# Patient Record
Sex: FEMALE | Race: WHITE | Employment: OTHER | URBAN - NONMETROPOLITAN AREA
[De-identification: names, ages, dates, MRNs, and addresses within clinical notes are randomized per-mention and may not be internally consistent; named-entity substitution may affect disease eponyms.]

---

## 2017-03-20 ENCOUNTER — TELEPHONE (OUTPATIENT)
Dept: FAMILY MEDICINE CLINIC | Age: 57
End: 2017-03-20

## 2017-06-08 DIAGNOSIS — E78.2 MIXED HYPERLIPIDEMIA: Primary | ICD-10-CM

## 2017-06-08 RX ORDER — PRAVASTATIN SODIUM 40 MG
40 TABLET ORAL EVERY EVENING
Qty: 30 TABLET | Refills: 0 | Status: SHIPPED | OUTPATIENT
Start: 2017-06-08 | End: 2017-07-05 | Stop reason: SDUPTHER

## 2017-06-19 DIAGNOSIS — Z12.31 VISIT FOR SCREENING MAMMOGRAM: Primary | ICD-10-CM

## 2017-07-05 ENCOUNTER — OFFICE VISIT (OUTPATIENT)
Dept: FAMILY MEDICINE CLINIC | Age: 57
End: 2017-07-05
Payer: COMMERCIAL

## 2017-07-05 VITALS
HEIGHT: 64 IN | HEART RATE: 84 BPM | BODY MASS INDEX: 50.02 KG/M2 | RESPIRATION RATE: 16 BRPM | WEIGHT: 293 LBS | SYSTOLIC BLOOD PRESSURE: 130 MMHG | DIASTOLIC BLOOD PRESSURE: 88 MMHG

## 2017-07-05 DIAGNOSIS — E78.2 MIXED HYPERLIPIDEMIA: Primary | ICD-10-CM

## 2017-07-05 DIAGNOSIS — Z79.899 HIGH RISK MEDICATION USE: ICD-10-CM

## 2017-07-05 DIAGNOSIS — Z23 NEED FOR PROPHYLACTIC VACCINATION AGAINST DIPHTHERIA-TETANUS-PERTUSSIS (DTP): ICD-10-CM

## 2017-07-05 DIAGNOSIS — R73.01 IFG (IMPAIRED FASTING GLUCOSE): ICD-10-CM

## 2017-07-05 DIAGNOSIS — E03.9 ACQUIRED HYPOTHYROIDISM: ICD-10-CM

## 2017-07-05 PROCEDURE — 99214 OFFICE O/P EST MOD 30 MIN: CPT | Performed by: FAMILY MEDICINE

## 2017-07-05 RX ORDER — PRAVASTATIN SODIUM 40 MG
40 TABLET ORAL EVERY EVENING
Qty: 30 TABLET | Refills: 6 | Status: SHIPPED | OUTPATIENT
Start: 2017-07-05 | End: 2018-01-09 | Stop reason: SDUPTHER

## 2017-07-05 RX ORDER — ASPIRIN 81 MG/1
81 TABLET, CHEWABLE ORAL DAILY
COMMUNITY

## 2017-07-05 ASSESSMENT — PATIENT HEALTH QUESTIONNAIRE - PHQ9
SUM OF ALL RESPONSES TO PHQ9 QUESTIONS 1 & 2: 0
SUM OF ALL RESPONSES TO PHQ QUESTIONS 1-9: 0
2. FEELING DOWN, DEPRESSED OR HOPELESS: 0
1. LITTLE INTEREST OR PLEASURE IN DOING THINGS: 0

## 2017-09-16 ENCOUNTER — HOSPITAL ENCOUNTER (OUTPATIENT)
Dept: LAB | Age: 57
Setting detail: SPECIMEN
Discharge: HOME | End: 2017-09-16
Payer: COMMERCIAL

## 2017-09-16 DIAGNOSIS — E78.2 MIXED HYPERLIPIDEMIA: ICD-10-CM

## 2017-09-16 DIAGNOSIS — Z79.899 HIGH RISK MEDICATION USE: ICD-10-CM

## 2017-09-16 DIAGNOSIS — E03.9 ACQUIRED HYPOTHYROIDISM: ICD-10-CM

## 2017-09-16 LAB
AST SERPL-CCNC: 15 U/L
CHOLESTEROL/HDL RATIO: 2.8
CHOLESTEROL: 165 MG/DL
HDLC SERPL-MCNC: 58 MG/DL
LDL CHOLESTEROL: 86 MG/DL (ref 0–130)
TRIGL SERPL-MCNC: 103 MG/DL
VLDLC SERPL CALC-MCNC: NORMAL MG/DL (ref 1–30)

## 2017-09-16 PROCEDURE — 80061 LIPID PANEL: CPT

## 2017-09-16 PROCEDURE — 84450 TRANSFERASE (AST) (SGOT): CPT

## 2017-09-16 PROCEDURE — 36415 COLL VENOUS BLD VENIPUNCTURE: CPT

## 2017-09-19 DIAGNOSIS — E03.9 ACQUIRED HYPOTHYROIDISM: Primary | ICD-10-CM

## 2018-01-06 ENCOUNTER — HOSPITAL ENCOUNTER (OUTPATIENT)
Dept: LAB | Age: 58
Setting detail: SPECIMEN
Discharge: HOME | End: 2018-01-06
Payer: COMMERCIAL

## 2018-01-06 DIAGNOSIS — E03.9 ACQUIRED HYPOTHYROIDISM: ICD-10-CM

## 2018-01-06 DIAGNOSIS — R73.01 IFG (IMPAIRED FASTING GLUCOSE): ICD-10-CM

## 2018-01-06 DIAGNOSIS — E78.2 MIXED HYPERLIPIDEMIA: ICD-10-CM

## 2018-01-06 LAB
ALBUMIN SERPL-MCNC: 4.1 G/DL (ref 3.5–5.2)
ALBUMIN/GLOBULIN RATIO: 1.4 (ref 1–2.5)
ALP BLD-CCNC: 101 U/L (ref 35–104)
ALT SERPL-CCNC: 15 U/L (ref 5–33)
ANION GAP SERPL CALCULATED.3IONS-SCNC: 13 MMOL/L (ref 9–17)
AST SERPL-CCNC: 16 U/L
BILIRUB SERPL-MCNC: 0.74 MG/DL (ref 0.3–1.2)
BUN BLDV-MCNC: 14 MG/DL (ref 6–20)
BUN/CREAT BLD: 26 (ref 9–20)
CALCIUM SERPL-MCNC: 9.1 MG/DL (ref 8.6–10.4)
CHLORIDE BLD-SCNC: 102 MMOL/L (ref 98–107)
CHOLESTEROL/HDL RATIO: 3.3
CHOLESTEROL: 209 MG/DL
CO2: 29 MMOL/L (ref 20–31)
CREAT SERPL-MCNC: 0.53 MG/DL (ref 0.5–0.9)
ESTIMATED AVERAGE GLUCOSE: 114 MG/DL
GFR AFRICAN AMERICAN: >60 ML/MIN
GFR NON-AFRICAN AMERICAN: >60 ML/MIN
GFR SERPL CREATININE-BSD FRML MDRD: ABNORMAL ML/MIN/{1.73_M2}
GFR SERPL CREATININE-BSD FRML MDRD: ABNORMAL ML/MIN/{1.73_M2}
GLUCOSE BLD-MCNC: 110 MG/DL (ref 70–99)
HBA1C MFR BLD: 5.6 % (ref 4.8–5.9)
HDLC SERPL-MCNC: 63 MG/DL
LDL CHOLESTEROL: 126 MG/DL (ref 0–130)
POTASSIUM SERPL-SCNC: 4.3 MMOL/L (ref 3.7–5.3)
SODIUM BLD-SCNC: 144 MMOL/L (ref 135–144)
THYROXINE, FREE: 1.17 NG/DL (ref 0.93–1.7)
TOTAL PROTEIN: 7.1 G/DL (ref 6.4–8.3)
TRIGL SERPL-MCNC: 98 MG/DL
TSH SERPL DL<=0.05 MIU/L-ACNC: 3.55 MIU/L (ref 0.3–5)
VLDLC SERPL CALC-MCNC: ABNORMAL MG/DL (ref 1–30)

## 2018-01-06 PROCEDURE — 84439 ASSAY OF FREE THYROXINE: CPT

## 2018-01-06 PROCEDURE — 36415 COLL VENOUS BLD VENIPUNCTURE: CPT

## 2018-01-06 PROCEDURE — 80053 COMPREHEN METABOLIC PANEL: CPT

## 2018-01-06 PROCEDURE — 83036 HEMOGLOBIN GLYCOSYLATED A1C: CPT

## 2018-01-06 PROCEDURE — 84443 ASSAY THYROID STIM HORMONE: CPT

## 2018-01-06 PROCEDURE — 80061 LIPID PANEL: CPT

## 2018-01-09 ENCOUNTER — OFFICE VISIT (OUTPATIENT)
Dept: FAMILY MEDICINE CLINIC | Age: 58
End: 2018-01-09
Payer: COMMERCIAL

## 2018-01-09 VITALS
SYSTOLIC BLOOD PRESSURE: 120 MMHG | DIASTOLIC BLOOD PRESSURE: 68 MMHG | HEART RATE: 86 BPM | WEIGHT: 293 LBS | BODY MASS INDEX: 50.02 KG/M2 | RESPIRATION RATE: 16 BRPM | HEIGHT: 64 IN

## 2018-01-09 DIAGNOSIS — Z12.4 CERVICAL CANCER SCREENING: ICD-10-CM

## 2018-01-09 DIAGNOSIS — E78.2 MIXED HYPERLIPIDEMIA: ICD-10-CM

## 2018-01-09 DIAGNOSIS — Z23 NEED FOR SHINGLES VACCINE: ICD-10-CM

## 2018-01-09 DIAGNOSIS — E03.9 ACQUIRED HYPOTHYROIDISM: Primary | ICD-10-CM

## 2018-01-09 DIAGNOSIS — R73.01 IFG (IMPAIRED FASTING GLUCOSE): ICD-10-CM

## 2018-01-09 PROCEDURE — 99214 OFFICE O/P EST MOD 30 MIN: CPT | Performed by: FAMILY MEDICINE

## 2018-01-09 RX ORDER — LEVOTHYROXINE SODIUM 88 UG/1
88 TABLET ORAL DAILY
Qty: 30 TABLET | Refills: 11 | Status: SHIPPED | OUTPATIENT
Start: 2018-01-09 | End: 2019-01-16 | Stop reason: SDUPTHER

## 2018-01-09 RX ORDER — PRAVASTATIN SODIUM 40 MG
40 TABLET ORAL EVERY EVENING
Qty: 30 TABLET | Refills: 6 | Status: SHIPPED | OUTPATIENT
Start: 2018-01-09 | End: 2018-07-11 | Stop reason: SINTOL

## 2018-01-09 NOTE — PROGRESS NOTES
[address], [address] [place] [address] Drive                        Telephone [phone]             Fax [phone]     Deana Nyhan  1960  MRN:  [id number]  Date of visit:  1/9/18    Subjective:    Deana Nyhan is a 56 y.o.  female who presents to [facility] today (1/9/18) for follow up/evaluation of:  Hyperlipidemia (6 month follow up) and Hyperglycemia (6 month follow up)      She states that she has been feeling well. She is tolerating her medications well. She denies chest pain or shortness of breath with activity or at rest.  She has no new concerns or complaints at this time. She has the following problem list:  Patient Active Problem List   Diagnosis    Acquired hypothyroidism    Vitamin D deficiency    Extreme obesity ([address]    Mixed hyperlipidemia    IFG (impaired fasting glucose)    High risk medication use        Current medications are:  Outpatient Prescriptions Marked as Taking for the 1/9/18 encounter (Office Visit) with Gisele Siemens, MD   Medication Sig Dispense Refill    aspirin 81 MG chewable tablet Take 81 mg by mouth daily      pravastatin (PRAVACHOL) 40 MG tablet Take 1 tablet by mouth every evening 30 tablet 6    levothyroxine (SYNTHROID) 88 MCG tablet Take 1 tablet by mouth daily 30 tablet 11    vitamin D (CHOLECALCIFEROL) 1000 UNIT TABS tablet Take 1,000 Units by mouth daily.  Calcium Carbonate-Vitamin D (CALCIUM PLUS VITAMIN D PO) Take  by mouth daily.  Multiple Vitamins-Minerals (MULTIVITAMIN PO) Take  by mouth daily. She is allergic to atorvastatin and penicillins. She  reports that she has never smoked.  She has never used smokeless tobacco.      Objective:    Vitals:    01/09/18 1547   BP: 120/68   Site: Right Arm   Position: Sitting   Cuff Size: Large Adult   Pulse: 86   Resp: 16   Weight: (!) 320 lb 6.4 oz (145.3 kg) by mouth daily  Dispense: 30 tablet; Refill: 11    2. Mixed hyperlipidemia  Her lipid profile was reasonable on her recent lab work. She is tolerating Pravastatin well; this was refilled:   - pravastatin (PRAVACHOL) 40 MG tablet; Take 1 tablet by mouth every evening  Dispense: 30 tablet; Refill: 6    Labs were ordered to be done prior to her return visit in 6 months:  - Comprehensive Metabolic Panel; Future  - Lipid Panel; Future    3. IFG (impaired fasting glucose)  Her fasting glucose is elevated, but her HgbA1c is within normal limits. I recommended that she decrease her intake of processed carbohydrates, begin a regular exercise program, and attempt to lose weight.  - Hemoglobin A1C; Future prior to her return visit in 6 months. 4. Routine health maintenance  Health maintenance was reviewed with the patient. She has received an influenza vaccine this influenza season. Colonoscopy was recommended. She declined to schedule an appointment for a colonoscopy consultation, but she is considering Cologuard. Tdap was recommended and declined. She was advised that she is due for an annual exam and Pap test.  Shingrix was recommended, and a printed prescription for Shingrix was given to the patient. There is no indication for Pneumovax or Prevnar-13 at this time.      (Please note that portions of this note were completed with a voice-recognition program. Efforts were made to edit the dictation but occasionally words are mis-transcribed.)

## 2018-04-03 ENCOUNTER — TELEPHONE (OUTPATIENT)
Dept: FAMILY MEDICINE CLINIC | Age: 58
End: 2018-04-03

## 2018-07-05 ENCOUNTER — HOSPITAL ENCOUNTER (OUTPATIENT)
Dept: LAB | Age: 58
Setting detail: SPECIMEN
Discharge: HOME | End: 2018-07-05
Payer: COMMERCIAL

## 2018-07-05 DIAGNOSIS — R73.01 IFG (IMPAIRED FASTING GLUCOSE): ICD-10-CM

## 2018-07-05 DIAGNOSIS — E78.2 MIXED HYPERLIPIDEMIA: ICD-10-CM

## 2018-07-05 LAB
ALBUMIN SERPL-MCNC: 3.7 G/DL (ref 3.5–5.2)
ALBUMIN/GLOBULIN RATIO: 1.1 (ref 1–2.5)
ALP BLD-CCNC: 110 U/L (ref 35–104)
ALT SERPL-CCNC: 14 U/L (ref 5–33)
ANION GAP SERPL CALCULATED.3IONS-SCNC: 10 MMOL/L (ref 9–17)
AST SERPL-CCNC: 13 U/L
BILIRUB SERPL-MCNC: 0.54 MG/DL (ref 0.3–1.2)
BUN BLDV-MCNC: 14 MG/DL (ref 6–20)
BUN/CREAT BLD: 20 (ref 9–20)
CALCIUM SERPL-MCNC: 9.1 MG/DL (ref 8.6–10.4)
CHLORIDE BLD-SCNC: 102 MMOL/L (ref 98–107)
CHOLESTEROL/HDL RATIO: 5.1
CHOLESTEROL: 240 MG/DL
CO2: 30 MMOL/L (ref 20–31)
CREAT SERPL-MCNC: 0.71 MG/DL (ref 0.5–0.9)
ESTIMATED AVERAGE GLUCOSE: 120 MG/DL
GFR AFRICAN AMERICAN: >60 ML/MIN
GFR NON-AFRICAN AMERICAN: >60 ML/MIN
GFR SERPL CREATININE-BSD FRML MDRD: ABNORMAL ML/MIN/{1.73_M2}
GFR SERPL CREATININE-BSD FRML MDRD: ABNORMAL ML/MIN/{1.73_M2}
GLUCOSE BLD-MCNC: 107 MG/DL (ref 70–99)
HBA1C MFR BLD: 5.8 % (ref 4.8–5.9)
HDLC SERPL-MCNC: 47 MG/DL
LDL CHOLESTEROL: 161 MG/DL (ref 0–130)
POTASSIUM SERPL-SCNC: 4.3 MMOL/L (ref 3.7–5.3)
SODIUM BLD-SCNC: 142 MMOL/L (ref 135–144)
TOTAL PROTEIN: 7 G/DL (ref 6.4–8.3)
TRIGL SERPL-MCNC: 158 MG/DL
VLDLC SERPL CALC-MCNC: ABNORMAL MG/DL (ref 1–30)

## 2018-07-05 PROCEDURE — 80061 LIPID PANEL: CPT

## 2018-07-05 PROCEDURE — 36415 COLL VENOUS BLD VENIPUNCTURE: CPT

## 2018-07-05 PROCEDURE — 83036 HEMOGLOBIN GLYCOSYLATED A1C: CPT

## 2018-07-05 PROCEDURE — 80053 COMPREHEN METABOLIC PANEL: CPT

## 2018-07-11 ENCOUNTER — HOSPITAL ENCOUNTER (OUTPATIENT)
Age: 58
Setting detail: SPECIMEN
Discharge: HOME | End: 2018-07-11
Payer: COMMERCIAL

## 2018-07-11 ENCOUNTER — OFFICE VISIT (OUTPATIENT)
Dept: FAMILY MEDICINE CLINIC | Age: 58
End: 2018-07-11
Payer: COMMERCIAL

## 2018-07-11 VITALS
BODY MASS INDEX: 50.02 KG/M2 | HEIGHT: 64 IN | DIASTOLIC BLOOD PRESSURE: 86 MMHG | SYSTOLIC BLOOD PRESSURE: 138 MMHG | HEART RATE: 84 BPM | WEIGHT: 293 LBS

## 2018-07-11 DIAGNOSIS — R03.0 ELEVATED BLOOD PRESSURE READING: ICD-10-CM

## 2018-07-11 DIAGNOSIS — R73.01 IFG (IMPAIRED FASTING GLUCOSE): ICD-10-CM

## 2018-07-11 DIAGNOSIS — Z01.419 WELL FEMALE EXAM WITH ROUTINE GYNECOLOGICAL EXAM: ICD-10-CM

## 2018-07-11 DIAGNOSIS — Z01.419 WELL FEMALE EXAM WITH ROUTINE GYNECOLOGICAL EXAM: Primary | ICD-10-CM

## 2018-07-11 DIAGNOSIS — Z12.31 ENCOUNTER FOR SCREENING MAMMOGRAM FOR BREAST CANCER: ICD-10-CM

## 2018-07-11 DIAGNOSIS — Z23 NEED FOR SHINGLES VACCINE: ICD-10-CM

## 2018-07-11 DIAGNOSIS — E78.2 MIXED HYPERLIPIDEMIA: ICD-10-CM

## 2018-07-11 DIAGNOSIS — E03.9 ACQUIRED HYPOTHYROIDISM: ICD-10-CM

## 2018-07-11 PROCEDURE — 87624 HPV HI-RISK TYP POOLED RSLT: CPT

## 2018-07-11 PROCEDURE — G0145 SCR C/V CYTO,THINLAYER,RESCR: HCPCS

## 2018-07-11 PROCEDURE — 99396 PREV VISIT EST AGE 40-64: CPT | Performed by: FAMILY MEDICINE

## 2018-07-11 PROCEDURE — 99213 OFFICE O/P EST LOW 20 MIN: CPT | Performed by: FAMILY MEDICINE

## 2018-07-11 RX ORDER — EZETIMIBE 10 MG/1
10 TABLET ORAL DAILY
Qty: 30 TABLET | Refills: 3 | Status: SHIPPED | OUTPATIENT
Start: 2018-07-11 | End: 2018-12-20 | Stop reason: SDUPTHER

## 2018-07-11 RX ORDER — BLOOD PRESSURE TEST KIT
KIT MISCELLANEOUS
Qty: 1 KIT | Refills: 0 | Status: SHIPPED | OUTPATIENT
Start: 2018-07-11

## 2018-07-11 ASSESSMENT — PATIENT HEALTH QUESTIONNAIRE - PHQ9
1. LITTLE INTEREST OR PLEASURE IN DOING THINGS: 0
SUM OF ALL RESPONSES TO PHQ9 QUESTIONS 1 & 2: 0
2. FEELING DOWN, DEPRESSED OR HOPELESS: 0
SUM OF ALL RESPONSES TO PHQ QUESTIONS 1-9: 0

## 2018-07-11 NOTE — PROGRESS NOTES
- 2.5 Final    GFR Non- 07/05/2018 >60  >60 mL/min Final    GFR  07/05/2018 >60  >60 mL/min Final    GFR Comment 07/05/2018        Final    Comment: Average GFR for 52-63 years old:   80 mL/min/1.73sq m  Chronic Kidney Disease:   <60 mL/min/1.73sq m  Kidney failure:   <15 mL/min/1.73sq m              eGFR calculated using average adult body mass. Additional eGFR calculator   available at:        [email or web address]            GFR Staging 07/05/2018 NOT REPORTED   Final    Hemoglobin A1C 07/05/2018 5.8  4.8 - 5.9 % Final    Estimated Avg Glucose 07/05/2018 120  mg/dL Final    Cholesterol 07/05/2018 240* <200 mg/dL Final    Comment:    Cholesterol Guidelines:      <200  Desirable   200-240  Borderline      >240  Undesirable         HDL 07/05/2018 47  >40 mg/dL Final    Comment:    HDL Guidelines:    <40     Undesirable   40-59    Borderline    >59     Desirable         LDL Cholesterol 07/05/2018 161* 0 - 130 mg/dL Final    Comment:    LDL Guidelines:     <100    Desirable   100-129   Near to/above Desirable   130-159   Borderline      >159   Undesirable     Direct (measured) LDL and calculated LDL are not interchangeable tests.  Chol/HDL Ratio 07/05/2018 5.1* <5 Final            Triglycerides 07/05/2018 158* <150 mg/dL Final    Comment:    Triglyceride Guidelines:     <150   Desirable   150-199  Borderline   200-499  High     >499   Very high   Based on AHA Guidelines for fasting triglyceride, October 2012.  VLDL 07/05/2018 NOT REPORTED  1 - 30 mg/dL Final       Assessment and Plan:    1. Well female exam with routine gynecological exam  Pap smear was obtained today as she had a normal Pap test less than three years ago. Mammogram was ordered. She was advised to continue annual mammograms and physical exams, with a Pap test every 3 years. 2. Mixed hyperlipidemia  Her lipid profile was worse on her recent lab work.     She has been intolerant of statins in the past.  Zetia was prescribed:  - ezetimibe (ZETIA) 10 MG tablet; Take 1 tablet by mouth daily  Dispense: 30 tablet; Refill: 3    - Lipid Panel; Future prior to her return visit in 6 months. 3. IFG (impaired fasting glucose)  Her fasting glucose is elevated, but her HgbA1c is within normal limits. I recommended that she decrease her intake of processed carbohydrates, begin a regular exercise program, and attempt to lose weight.  - Hemoglobin A1C; Future prior to her return visit in 6 months. 4. Acquired hypothyroidism  TSH and free T4 were within normal limits on 1/6/2018. She was advised to continue with her current dose of Levothyroxine. Labs were ordered to be done prior to her return visit in 6 months:  - T4, Free; Future  - TSH without Reflex; Future    5. Elevated blood pressure reading  She was advised to check her blood pressure at home. She was given a prescription for a blood pressure monitor:  - Blood Pressure KIT; Check blood pressure daily prn. Dispense: 1 kit; Refill: 0  - Comprehensive Metabolic Panel; Future prior to her return visit in 6 months. 6.  Routine health maintenance  Health maintenance was reviewed with the patient. Screening mammogram was recommended and ordered. Shingrix was recommended, and a printed prescription for Shingrix was given to the patient. Colonoscopy was recommended. She declined. She plans to check with her insurance company to see if Cologuard is covered. I inadvertently did not discuss Tdap with her today. There is no indication for Pneumovax or Prevnar-13 at this time.          (Please note that portions of this note were completed with a voice-recognition program. Efforts were made to edit the dictation but occasionally words are mis-transcribed.)

## 2018-07-13 ENCOUNTER — HOSPITAL ENCOUNTER (OUTPATIENT)
Dept: MAMMOGRAPHY | Age: 58
Discharge: HOME | End: 2018-07-15
Payer: COMMERCIAL

## 2018-07-13 DIAGNOSIS — Z12.31 ENCOUNTER FOR SCREENING MAMMOGRAM FOR BREAST CANCER: ICD-10-CM

## 2018-07-13 PROCEDURE — 77063 BREAST TOMOSYNTHESIS BI: CPT

## 2018-07-26 DIAGNOSIS — R87.616 TRANSFORMATION ZONE ABSENT ON CERVICAL PAP SMEAR: Primary | ICD-10-CM

## 2018-07-27 LAB
HPV SAMPLE: NORMAL
HPV SOURCE: NORMAL
HPV, GENOTYPE 16: NOT DETECTED
HPV, GENOTYPE 18: NOT DETECTED
HPV, HIGH RISK OTHER: NOT DETECTED
HPV, INTERPRETATION: NORMAL

## 2018-07-28 LAB — CYTOLOGY REPORT: NORMAL

## 2018-12-20 DIAGNOSIS — E78.2 MIXED HYPERLIPIDEMIA: ICD-10-CM

## 2018-12-21 RX ORDER — EZETIMIBE 10 MG/1
10 TABLET ORAL DAILY
Qty: 30 TABLET | Refills: 3 | Status: SHIPPED | OUTPATIENT
Start: 2018-12-21 | End: 2019-01-16 | Stop reason: SDUPTHER

## 2019-01-12 ENCOUNTER — HOSPITAL ENCOUNTER (OUTPATIENT)
Dept: LAB | Age: 59
Discharge: HOME | End: 2019-01-12
Payer: COMMERCIAL

## 2019-01-12 DIAGNOSIS — R03.0 ELEVATED BLOOD PRESSURE READING: ICD-10-CM

## 2019-01-12 DIAGNOSIS — R73.01 IFG (IMPAIRED FASTING GLUCOSE): ICD-10-CM

## 2019-01-12 DIAGNOSIS — E78.2 MIXED HYPERLIPIDEMIA: ICD-10-CM

## 2019-01-12 DIAGNOSIS — E03.9 ACQUIRED HYPOTHYROIDISM: ICD-10-CM

## 2019-01-12 LAB
ALBUMIN SERPL-MCNC: 4.1 G/DL (ref 3.5–5.2)
ALBUMIN/GLOBULIN RATIO: 1.2 (ref 1–2.5)
ALP BLD-CCNC: 99 U/L (ref 35–104)
ALT SERPL-CCNC: 16 U/L (ref 5–33)
ANION GAP SERPL CALCULATED.3IONS-SCNC: 12 MMOL/L (ref 9–17)
AST SERPL-CCNC: 15 U/L
BILIRUB SERPL-MCNC: 0.67 MG/DL (ref 0.3–1.2)
BUN BLDV-MCNC: 11 MG/DL (ref 6–20)
BUN/CREAT BLD: 17 (ref 9–20)
CALCIUM SERPL-MCNC: 9 MG/DL (ref 8.6–10.4)
CHLORIDE BLD-SCNC: 101 MMOL/L (ref 98–107)
CHOLESTEROL/HDL RATIO: 3.5
CHOLESTEROL: 189 MG/DL
CO2: 29 MMOL/L (ref 20–31)
CREAT SERPL-MCNC: 0.63 MG/DL (ref 0.5–0.9)
ESTIMATED AVERAGE GLUCOSE: 114 MG/DL
GFR AFRICAN AMERICAN: >60 ML/MIN
GFR NON-AFRICAN AMERICAN: >60 ML/MIN
GFR SERPL CREATININE-BSD FRML MDRD: ABNORMAL ML/MIN/{1.73_M2}
GFR SERPL CREATININE-BSD FRML MDRD: ABNORMAL ML/MIN/{1.73_M2}
GLUCOSE BLD-MCNC: 109 MG/DL (ref 70–99)
HBA1C MFR BLD: 5.6 % (ref 4.8–5.9)
HDLC SERPL-MCNC: 54 MG/DL
LDL CHOLESTEROL: 113 MG/DL (ref 0–130)
POTASSIUM SERPL-SCNC: 4.2 MMOL/L (ref 3.7–5.3)
SODIUM BLD-SCNC: 142 MMOL/L (ref 135–144)
THYROXINE, FREE: 1.14 NG/DL (ref 0.93–1.7)
TOTAL PROTEIN: 7.4 G/DL (ref 6.4–8.3)
TRIGL SERPL-MCNC: 112 MG/DL
TSH SERPL DL<=0.05 MIU/L-ACNC: 3.18 MIU/L (ref 0.3–5)
VLDLC SERPL CALC-MCNC: NORMAL MG/DL (ref 1–30)

## 2019-01-12 PROCEDURE — 84439 ASSAY OF FREE THYROXINE: CPT

## 2019-01-12 PROCEDURE — 80061 LIPID PANEL: CPT

## 2019-01-12 PROCEDURE — 84443 ASSAY THYROID STIM HORMONE: CPT

## 2019-01-12 PROCEDURE — 80053 COMPREHEN METABOLIC PANEL: CPT

## 2019-01-12 PROCEDURE — 83036 HEMOGLOBIN GLYCOSYLATED A1C: CPT

## 2019-01-12 PROCEDURE — 36415 COLL VENOUS BLD VENIPUNCTURE: CPT

## 2019-01-16 ENCOUNTER — OFFICE VISIT (OUTPATIENT)
Dept: FAMILY MEDICINE CLINIC | Age: 59
End: 2019-01-16
Payer: COMMERCIAL

## 2019-01-16 VITALS
HEIGHT: 64 IN | HEART RATE: 86 BPM | WEIGHT: 293 LBS | RESPIRATION RATE: 16 BRPM | DIASTOLIC BLOOD PRESSURE: 92 MMHG | SYSTOLIC BLOOD PRESSURE: 138 MMHG | BODY MASS INDEX: 50.02 KG/M2

## 2019-01-16 DIAGNOSIS — R03.0 ELEVATED BLOOD PRESSURE READING: ICD-10-CM

## 2019-01-16 DIAGNOSIS — R60.0 BILATERAL LOWER EXTREMITY EDEMA: ICD-10-CM

## 2019-01-16 DIAGNOSIS — E78.2 MIXED HYPERLIPIDEMIA: ICD-10-CM

## 2019-01-16 DIAGNOSIS — E03.9 ACQUIRED HYPOTHYROIDISM: Primary | ICD-10-CM

## 2019-01-16 DIAGNOSIS — R73.01 IFG (IMPAIRED FASTING GLUCOSE): ICD-10-CM

## 2019-01-16 PROCEDURE — 99214 OFFICE O/P EST MOD 30 MIN: CPT | Performed by: FAMILY MEDICINE

## 2019-01-16 RX ORDER — HYDROCHLOROTHIAZIDE 25 MG/1
25 TABLET ORAL EVERY MORNING
Qty: 90 TABLET | Refills: 1 | Status: SHIPPED | OUTPATIENT
Start: 2019-01-16 | End: 2019-05-09 | Stop reason: SDUPTHER

## 2019-01-16 RX ORDER — EZETIMIBE 10 MG/1
10 TABLET ORAL DAILY
Qty: 30 TABLET | Refills: 6 | Status: SHIPPED | OUTPATIENT
Start: 2019-01-16 | End: 2019-07-18 | Stop reason: SDUPTHER

## 2019-01-16 RX ORDER — LEVOTHYROXINE SODIUM 88 UG/1
88 TABLET ORAL DAILY
Qty: 30 TABLET | Refills: 11 | Status: SHIPPED | OUTPATIENT
Start: 2019-01-16 | End: 2020-01-20 | Stop reason: SDUPTHER

## 2019-01-16 ASSESSMENT — PATIENT HEALTH QUESTIONNAIRE - PHQ9
SUM OF ALL RESPONSES TO PHQ QUESTIONS 1-9: 0
2. FEELING DOWN, DEPRESSED OR HOPELESS: 0
SUM OF ALL RESPONSES TO PHQ9 QUESTIONS 1 & 2: 0
SUM OF ALL RESPONSES TO PHQ QUESTIONS 1-9: 0
1. LITTLE INTEREST OR PLEASURE IN DOING THINGS: 0

## 2019-04-05 ENCOUNTER — HOSPITAL ENCOUNTER (EMERGENCY)
Age: 59
Discharge: HOME | End: 2019-04-05
Attending: EMERGENCY MEDICINE
Payer: COMMERCIAL

## 2019-04-05 VITALS
HEIGHT: 64 IN | RESPIRATION RATE: 16 BRPM | SYSTOLIC BLOOD PRESSURE: 203 MMHG | OXYGEN SATURATION: 99 % | DIASTOLIC BLOOD PRESSURE: 93 MMHG | WEIGHT: 293 LBS | TEMPERATURE: 98.6 F | BODY MASS INDEX: 50.02 KG/M2 | HEART RATE: 94 BPM

## 2019-04-05 DIAGNOSIS — I10 CHRONIC HYPERTENSION: ICD-10-CM

## 2019-04-05 DIAGNOSIS — R04.0 EPISTAXIS: Primary | ICD-10-CM

## 2019-04-05 PROCEDURE — 6370000000 HC RX 637 (ALT 250 FOR IP): Performed by: EMERGENCY MEDICINE

## 2019-04-05 PROCEDURE — 2500000003 HC RX 250 WO HCPCS: Performed by: EMERGENCY MEDICINE

## 2019-04-05 PROCEDURE — 99283 EMERGENCY DEPT VISIT LOW MDM: CPT

## 2019-04-05 RX ORDER — CLONIDINE HYDROCHLORIDE 0.1 MG/1
0.1 TABLET ORAL ONCE
Status: COMPLETED | OUTPATIENT
Start: 2019-04-05 | End: 2019-04-05

## 2019-04-05 RX ORDER — TRANEXAMIC ACID 100 MG/ML
1000 INJECTION, SOLUTION INTRAVENOUS ONCE
Status: COMPLETED | OUTPATIENT
Start: 2019-04-05 | End: 2019-04-05

## 2019-04-05 RX ADMIN — TRANEXAMIC ACID 1000 MG: 1 INJECTION, SOLUTION INTRAVENOUS at 08:25

## 2019-04-05 RX ADMIN — CLONIDINE HYDROCHLORIDE 0.1 MG: 0.1 TABLET ORAL at 08:23

## 2019-04-05 ASSESSMENT — ENCOUNTER SYMPTOMS
VOMITING: 0
NAUSEA: 0
COUGH: 0

## 2019-04-05 NOTE — ED PROVIDER NOTES
[facility]  [facility]  Phone: [phone]        [address] [place] [address] ED  eMERGENCY dEPARTMENT eNCOUnter      Pt Name: Vikash Bentley  MRN: [id number]  Dariagfdillan 1960  Date of evaluation: 4/5/2019  Provider: Valencia Hernandes DO    CHIEF COMPLAINT       Chief Complaint   Patient presents with    Epistaxis         HISTORY OF PRESENT ILLNESS   (Location/Symptom, Timing/Onset,Context/Setting, Quality, Duration, Modifying Factors, Severity)  Note limiting factors. Vikash Bentley is a 66 y.o. female who presents to the emergency department for evaluation of nosebleed. She states that this did start last night, she slept sitting up which helped, today, was persistent. Patient has had some intermittent nosebleeds in the past.  She denies injury. She has history of hypertension and did not take any of her medications yet today because of the nosebleed. She denies headache, change in vision, chest pain and shortness of breath. Nursing Notes were reviewed. REVIEW OF SYSTEMS    (2-9systems for level 4, 10 or more for level 5)     Review of Systems   Constitutional: Negative for fever. HENT: Positive for nosebleeds. Respiratory: Negative for cough. Cardiovascular: Negative for chest pain. Gastrointestinal: Negative for nausea and vomiting. Genitourinary: Negative for hematuria. Musculoskeletal: Negative for joint swelling. Skin: Negative for rash. Neurological: Negative for weakness. All other systems reviewed and are negative. Except asnoted above the remainder of the review of systems was reviewed and negative.        PAST MEDICAL HISTORY     Past Medical History:   Diagnosis Date    Dyslipidemia     Elevated blood pressure     Extreme obesity     Hypothyroidism     IFG (impaired fasting glucose) 8/11/2016    Lower extremity edema     Vitamin D deficiency          SURGICAL HISTORY       Past Surgical History:   Procedure on phone: None     Gets together: None     Attends [employer] service: None     Active member of club or organization: None     Attends meetings of clubs or organizations: None     Relationship status: None    Intimate partner violence:     Fear of current or ex partner: None     Emotionally abused: None     Physically abused: None     Forced sexual activity: None   Other Topics Concern    None   Social History Narrative    None       SCREENINGS             PHYSICAL EXAM    (up to 7 for level 4, 8 or more for level 5)     ED Triage Vitals [04/05/19 0749]   BP Temp Temp Source Pulse Resp SpO2 Height Weight   (!) 201/117 98.6 °F (37 °C) Tympanic 98 18 98 % 5' 4\" (1.626 m) (!) 350 lb (158.8 kg)       Physical Exam   Constitutional: She appears well-developed and well-nourished. No distress. HENT:   Head: Normocephalic and atraumatic. Mouth/Throat: Oropharynx is clear and moist.   Epistaxis is noted, appears to be coming from the anterior left nare, along the nasal septum. Eyes: Conjunctivae are normal.   Pupils are equally round and reacting normally. Extraoccular muscles are grossly intact. Neck: Normal range of motion. No tracheal deviation present. Cardiovascular: Normal rate, regular rhythm, normal heart sounds and intact distal pulses. Exam reveals no friction rub. No murmur heard. Pulmonary/Chest: Effort normal and breath sounds normal. No stridor. No respiratory distress. She has no wheezes. She has no rales. Abdominal: Soft. She exhibits no distension. Musculoskeletal: Normal range of motion. She exhibits no edema or deformity. Neurological: She is alert. Answering questions appropriately. No gaze deficit. No gait abnormality. Moving all extremities. Skin: Skin is warm and dry. Psychiatric: She has a normal mood and affect.  Judgment normal.       EMERGENCY DEPARTMENT COURSE and DIFFERENTIAL DIAGNOSIS/MDM:   Vitals:    Vitals:    04/05/19 0749 04/05/19 0919   BP: (!) 201/117 (!) 203/93   Pulse: 98 94   Resp: 18 16   Temp: 98.6 °F (37 °C)    TempSrc: Tympanic    SpO2: 98% 99%   Weight: (!) 350 lb (158.8 kg)    Height: 5' 4\" (1.626 m)        Patient presents to the emergency department with the complaint described above. Vital signs show hypertension which will subsequently be treated even though it is grossly asymptomatic. Certainly bleeding from the nose could be related. I do not think any blood work is indicated, no imaging is indicated. I did have the patient blow her nose out very well, I used TXA on some gauze and we did pack it. Initial attempts at stopping the epistaxis failed. I did end up having to use a [employer]. I put 4 mL of air in it. She was comfortable, bleeding ceased. She has instructions to follow up Monday or Tuesday with primary care physician to have it removed. I have discussed the high blood pressure with the patient. I have explained good control of this is important for future health. I have recommended a discussion with their PCP at the next follow-up appointment. At this time the patient is without objective evidence of an acute process requiring hospitalization or inpatient management. They have remained hemodynamically stable throughout their entire ED visit and are stable for discharge with outpatient follow-up. Standard anticipatory guidance given to patient upon discharge. Have given them a specific time frame in which to follow-up and who to follow-up with. I have also advised them that they should return to the emergency department if they get worse, or not getting better or develop any new or concerning symptoms. Patient demonstrates understanding. PROCEDURES:  Unless otherwise noted below, none     Procedures    FINAL IMPRESSION      1. Epistaxis    2.  Chronic hypertension          DISPOSITION/PLAN   DISPOSITION Decision To Discharge 04/05/2019 09:30:45 AM      PATIENT REFERRED TO:  Ada Colin MD  [address] [facility] [address]  [phone]    In 3 days        DISCHARGE MEDICATIONS:  New Prescriptions    No medications on file          (Please note that portions of this note were completed with a voice recognition program.  Efforts were made to edit the dictations but occasionally words are mis-transcribed.)    Hattie Dwyer DO (electronically signed)  Board Certified Emergency Physician          Hattie Dwyer DO  04/05/19 8145

## 2019-04-05 NOTE — ED NOTES
Patient had been on call light. Nose bleeding again. Dr. Dairl Hashimoto at bedside, left nare is packed with 5.5 rapid rhino.      Dyan Flannery RN  04/05/19 9576

## 2019-04-08 ENCOUNTER — CARE COORDINATION (OUTPATIENT)
Dept: CARE COORDINATION | Age: 59
End: 2019-04-08

## 2019-04-08 NOTE — CARE COORDINATION
Patient was called to follow up with most recent ER visit. There was no answer. A message was left on voicemail to have patient call back regarding ER visit. Office number given [phone].

## 2019-04-10 ENCOUNTER — TELEPHONE (OUTPATIENT)
Dept: FAMILY MEDICINE CLINIC | Age: 59
End: 2019-04-10

## 2019-04-10 NOTE — TELEPHONE ENCOUNTER
Next available appointment after 3:30 would be 04/23/119. Would you be willing to see patient in UC tomorrow?

## 2019-04-10 NOTE — TELEPHONE ENCOUNTER
Pt calling stating that she was seen in [facility] ER on 4/5/19 for a nose bleed and was found to be hypertensive. Pt has taken her blood pressure a few time since the -170/. Pt denies headache, chest pain, and SOB. Pt needs appointment after 3:30. Please call pt and advise.

## 2019-04-11 ENCOUNTER — OFFICE VISIT (OUTPATIENT)
Dept: PRIMARY CARE CLINIC | Age: 59
End: 2019-04-11
Payer: COMMERCIAL

## 2019-04-11 VITALS
TEMPERATURE: 99.2 F | BODY MASS INDEX: 60.08 KG/M2 | DIASTOLIC BLOOD PRESSURE: 100 MMHG | OXYGEN SATURATION: 96 % | HEART RATE: 98 BPM | SYSTOLIC BLOOD PRESSURE: 170 MMHG | WEIGHT: 293 LBS

## 2019-04-11 DIAGNOSIS — I10 ESSENTIAL HYPERTENSION: Primary | ICD-10-CM

## 2019-04-11 PROCEDURE — 99214 OFFICE O/P EST MOD 30 MIN: CPT | Performed by: FAMILY MEDICINE

## 2019-04-11 RX ORDER — LISINOPRIL 20 MG/1
20 TABLET ORAL DAILY
Qty: 30 TABLET | Refills: 1 | Status: SHIPPED | OUTPATIENT
Start: 2019-04-11 | End: 2019-06-10 | Stop reason: SDUPTHER

## 2019-04-12 NOTE — PROGRESS NOTES
[facility]             [address] [place] [address] [place], [address]                        Telephone [phone]             Fax [phone]       Reginaldo Osorio  1960  MRN:  [id number]  Date of visit:  4/11/2019     Subjective:    Reginaldo Osorio is a 66 y.o.  female who presents to [facility] today (4/11/2019) for evaluation of:  Epistaxis (seen in ED last week. ) and Hypertension      She was at [facility] ER on 4/5/2019 for evaluation of epistaxis. Her blood pressure was elevated (203/93, 201/117) at the ER. She has been monitoring her blood pressure at home, and her systolic blood pressure has been in the 170s. She states that she has had occasional headaches. She has taken Hydrochlorothiazide for many years. She has never been on any other medications for blood pressure. She reports that packing was placed in her nose at the ER visit, and the packing fell out on 4/8/2019. She has the following problem list:  Patient Active Problem List   Diagnosis    Acquired hypothyroidism    Vitamin D deficiency    Extreme obesity    Mixed hyperlipidemia    IFG (impaired fasting glucose)    High risk medication use        Current medications are:  Current Outpatient Medications   Medication Sig Dispense Refill    ezetimibe (ZETIA) 10 MG tablet Take 1 tablet by mouth daily 30 tablet 6    levothyroxine (SYNTHROID) 88 MCG tablet Take 1 tablet by mouth daily 30 tablet 11    hydrochlorothiazide (HYDRODIURIL) 25 MG tablet Take 1 tablet by mouth every morning 90 tablet 1    Blood Pressure KIT Check blood pressure daily prn. 1 kit 0    aspirin 81 MG chewable tablet Take 81 mg by mouth daily      vitamin D (CHOLECALCIFEROL) 1000 UNIT TABS tablet Take 1,000 Units by mouth daily.  Calcium Carbonate-Vitamin D (CALCIUM PLUS VITAMIN D PO) Take  by mouth daily.       Multiple Vitamins-Minerals (MULTIVITAMIN PO) Take  by mouth daily. No current facility-administered medications for this visit. She is allergic to atorvastatin; penicillins; and pravastatin. She  reports that she has never smoked. She has never used smokeless tobacco.      Objective:    Vitals:    04/11/19 2018 04/11/19 2020   BP: (!) 180/100 (!) 170/100   Site: Left Upper Arm Left Upper Arm   Position: Sitting Sitting   Cuff Size: Large Adult Large Adult   Pulse: 98    Temp: 99.2 °F (37.3 °C)    TempSrc: Tympanic    SpO2: 96%    Weight: (!) 350 lb (158.8 kg)      Body mass index is 60.08 kg/m². Extremely obese  female healthy-appearing, alert, no distress, cooperative. Neck is supple, with no lymphadenopathy. Chest is clear to auscultation, no wheezes, rales, or rhonchi. Heart sounds are regular rate and rhythm, no murmurs. Lower extremities have trace to 1+ edema. Assessment and Plan:    Essential hypertension  She was advised to continue Hydrochlorothiazide. Lisinopril 20 mg daily was added:  - lisinopril (PRINIVIL;ZESTRIL) 20 MG tablet; Take 1 tablet by mouth daily  Dispense: 30 tablet; Refill: 1    She was advised to continue to check her blood pressure frequently at home, and to have her blood pressure checked at the school once a week if possible. I will have my nurse call to schedule an appointment in 3-4 weeks for reevaluation. She was advised to call if she has difficulty tolerating the medication or her blood pressure remains elevated. Printed information regarding Elevated Blood Pressure was provided to the patient with her after visit summary.        (Please note that portions of this note were completed with a voice-recognition program. Efforts were made to edit the dictation but occasionally words are mis-transcribed.)

## 2019-04-12 NOTE — PATIENT INSTRUCTIONS
Patient Education        Elevated Blood Pressure: Care Instructions  Your Care Instructions    Blood pressure is a measure of how hard the blood pushes against the walls of your arteries. It's normal for blood pressure to go up and down throughout the day. But if it stays up over time, you have high blood pressure. Two numbers tell you your blood pressure. The first number is the systolic pressure. It shows how hard the blood pushes when your heart is pumping. The second number is the diastolic pressure. It shows how hard the blood pushes between heartbeats, when your heart is relaxed and filling with blood. An ideal blood pressure in adults is less than 120/80 (say \"120 over 80\"). High blood pressure is 140/90 or higher. You have high blood pressure if your top number is 140 or higher or your bottom number is 90 or higher, or both. The main test for high blood pressure is simple, fast, and painless. To diagnose high blood pressure, your doctor will test your blood pressure at different times. After testing your blood pressure, your doctor may ask you to test it again when you are home. If you are diagnosed with high blood pressure, you can work with your doctor to make a long-term plan to manage it. Follow-up care is a key part of your treatment and safety. Be sure to make and go to all appointments, and call your doctor if you are having problems. It's also a good idea to know your test results and keep a list of the medicines you take. How can you care for yourself at home? · Do not smoke. Smoking increases your risk for heart attack and stroke. If you need help quitting, talk to your doctor about stop-smoking programs and medicines. These can increase your chances of quitting for good. · Stay at a healthy weight. · Try to limit how much sodium you eat to less than 2,300 milligrams (mg) a day. Your doctor may ask you to try to eat less than 1,500 mg a day. · Be physically active.  Get at least 30 minutes of exercise on most days of the week. Walking is a good choice. You also may want to do other activities, such as running, swimming, cycling, or playing tennis or team sports. · Avoid or limit alcohol. Talk to your doctor about whether you can drink any alcohol. · Eat plenty of fruits, vegetables, and low-fat dairy products. Eat less saturated and total fats. · Learn how to check your blood pressure at home. When should you call for help? Call your doctor now or seek immediate medical care if:    · Your blood pressure is much higher than normal (such as 180/110 or higher).     · You think high blood pressure is causing symptoms such as:  ¨ Severe headache. ¨ Blurry vision.    Watch closely for changes in your health, and be sure to contact your doctor if:    · You do not get better as expected. Where can you learn more? Go to https://[employer]. org and sign in to your [employer] account. Enter C850 in the [employer] box to learn more about \"Elevated Blood Pressure: Care Instructions. \"     If you do not have an account, please click on the \"Sign Up Now\" link. Current as of: May 10, 2017  Content Version: 11.6  © 7116-1249 [employer]. Care instructions adapted under license by [facility] ([facility]). If you have questions about a medical condition or this instruction, always ask your healthcare professional. [address] any warranty or liability for your use of this information.

## 2019-04-16 ENCOUNTER — TELEPHONE (OUTPATIENT)
Dept: FAMILY MEDICINE CLINIC | Age: 59
End: 2019-04-16

## 2019-04-16 DIAGNOSIS — R03.0 ELEVATED BLOOD PRESSURE READING: Primary | ICD-10-CM

## 2019-04-16 NOTE — TELEPHONE ENCOUNTER
Pt calling stating her BP has been running from 160/94 to 130/90. Pt did schedule follow up appt for 5-9. Any concerns or questions contact pt at above number.

## 2019-04-16 NOTE — TELEPHONE ENCOUNTER
----- Message from Mariel Christine MD sent at 4/11/2019  9:43 PM EDT -----  Call 4/16/2019 to check blood pressure readings and schedule follow up appointment in 3-4 weeks.

## 2019-05-04 ENCOUNTER — HOSPITAL ENCOUNTER (OUTPATIENT)
Dept: LAB | Age: 59
Discharge: HOME | End: 2019-05-04
Payer: COMMERCIAL

## 2019-05-04 DIAGNOSIS — R03.0 ELEVATED BLOOD PRESSURE READING: ICD-10-CM

## 2019-05-04 LAB
ANION GAP SERPL CALCULATED.3IONS-SCNC: 11 MMOL/L (ref 9–17)
BUN BLDV-MCNC: 17 MG/DL (ref 6–20)
BUN/CREAT BLD: 22 (ref 9–20)
CALCIUM SERPL-MCNC: 9.2 MG/DL (ref 8.6–10.4)
CHLORIDE BLD-SCNC: 102 MMOL/L (ref 98–107)
CO2: 29 MMOL/L (ref 20–31)
CREAT SERPL-MCNC: 0.78 MG/DL (ref 0.5–0.9)
GFR AFRICAN AMERICAN: >60 ML/MIN
GFR NON-AFRICAN AMERICAN: >60 ML/MIN
GFR SERPL CREATININE-BSD FRML MDRD: ABNORMAL ML/MIN/{1.73_M2}
GFR SERPL CREATININE-BSD FRML MDRD: ABNORMAL ML/MIN/{1.73_M2}
GLUCOSE BLD-MCNC: 107 MG/DL (ref 70–99)
POTASSIUM SERPL-SCNC: 4 MMOL/L (ref 3.7–5.3)
SODIUM BLD-SCNC: 142 MMOL/L (ref 135–144)

## 2019-05-04 PROCEDURE — 80048 BASIC METABOLIC PNL TOTAL CA: CPT

## 2019-05-04 PROCEDURE — 36415 COLL VENOUS BLD VENIPUNCTURE: CPT

## 2019-05-09 ENCOUNTER — OFFICE VISIT (OUTPATIENT)
Dept: FAMILY MEDICINE CLINIC | Age: 59
End: 2019-05-09
Payer: COMMERCIAL

## 2019-05-09 VITALS
DIASTOLIC BLOOD PRESSURE: 96 MMHG | HEART RATE: 80 BPM | WEIGHT: 293 LBS | RESPIRATION RATE: 16 BRPM | TEMPERATURE: 97.6 F | BODY MASS INDEX: 50.02 KG/M2 | OXYGEN SATURATION: 96 % | SYSTOLIC BLOOD PRESSURE: 132 MMHG | HEIGHT: 64 IN

## 2019-05-09 DIAGNOSIS — I10 ESSENTIAL HYPERTENSION: Primary | ICD-10-CM

## 2019-05-09 DIAGNOSIS — Z12.39 SCREENING FOR BREAST CANCER: ICD-10-CM

## 2019-05-09 DIAGNOSIS — R60.0 BILATERAL LOWER EXTREMITY EDEMA: ICD-10-CM

## 2019-05-09 DIAGNOSIS — J30.2 SEASONAL ALLERGIES: ICD-10-CM

## 2019-05-09 PROCEDURE — 99213 OFFICE O/P EST LOW 20 MIN: CPT | Performed by: FAMILY MEDICINE

## 2019-05-09 RX ORDER — HYDROCHLOROTHIAZIDE 50 MG/1
50 TABLET ORAL EVERY MORNING
Qty: 90 TABLET | Refills: 0 | Status: SHIPPED | OUTPATIENT
Start: 2019-05-09 | End: 2019-07-18 | Stop reason: SDUPTHER

## 2019-05-09 ASSESSMENT — PATIENT HEALTH QUESTIONNAIRE - PHQ9
1. LITTLE INTEREST OR PLEASURE IN DOING THINGS: 0
SUM OF ALL RESPONSES TO PHQ9 QUESTIONS 1 & 2: 0
SUM OF ALL RESPONSES TO PHQ QUESTIONS 1-9: 0
SUM OF ALL RESPONSES TO PHQ QUESTIONS 1-9: 0
2. FEELING DOWN, DEPRESSED OR HOPELESS: 0

## 2019-05-09 NOTE — PROGRESS NOTES
Patient refused Tdap vaccine,is on a waiting list for shingles vaccine,has information for Cologuard-will check with her insurance.

## 2019-05-09 NOTE — PROGRESS NOTES
mg/dL Final    CREATININE 05/04/2019 0.78  0.50 - 0.90 mg/dL Final    Bun/Cre Ratio 05/04/2019 22* 9 - 20 Final    Calcium 05/04/2019 9.2  8.6 - 10.4 mg/dL Final    Sodium 05/04/2019 142  135 - 144 mmol/L Final    Potassium 05/04/2019 4.0  3.7 - 5.3 mmol/L Final    Chloride 05/04/2019 102  98 - 107 mmol/L Final    CO2 05/04/2019 29  20 - 31 mmol/L Final    Anion Gap 05/04/2019 11  9 - 17 mmol/L Final    GFR Non- 05/04/2019 >60  >60 mL/min Final    GFR  05/04/2019 >60  >60 mL/min Final    GFR Comment 05/04/2019        Final    Comment: Average GFR for 52-63 years old:   80 mL/min/1.73sq m  Chronic Kidney Disease:   <60 mL/min/1.73sq m  Kidney failure:   <15 mL/min/1.73sq m              eGFR calculated using average adult body mass. Additional eGFR calculator available at:        [email or web address]            GFR Staging 05/04/2019 NOT REPORTED   Final         Assessment and Plan:    1. Essential hypertension  2. Bilateral lower extremity edema  Her blood pressure is improved, but is not at goal.  (BP: (!) 132/96)   She was advised to continue Lisinopril, and increase her dose of Hydrochlorothiazide from 25 mg daily to 50 mg daily:  - hydrochlorothiazide (HYDRODIURIL) 50 MG tablet; Take 1 tablet by mouth every morning  Dispense: 90 tablet; Refill: 0    3. Seasonal allergies  I advised her to begin Loratadine and/or Flonase. 4. Routine health maintenance  Health maintenance was reviewed with the patient. Screening mammogram was recommended and ordered. Colonoscopy was recommended. She will not agree to schedule a colonoscopy, but she is interested in fecal DNA testing. She would like to check her insurance coverage first.  Screening mammogram was recommended and ordered. Tdap was recommended and declined.       (Please note that portions of this note were completed with a voice-recognition program. Efforts were made to edit the dictation but occasionally words are mis-transcribed.)

## 2019-06-10 DIAGNOSIS — I10 ESSENTIAL HYPERTENSION: ICD-10-CM

## 2019-06-10 RX ORDER — LISINOPRIL 20 MG/1
TABLET ORAL
Qty: 30 TABLET | Refills: 1 | Status: SHIPPED | OUTPATIENT
Start: 2019-06-10 | End: 2019-07-18 | Stop reason: SDUPTHER

## 2019-06-11 DIAGNOSIS — I10 ESSENTIAL HYPERTENSION: ICD-10-CM

## 2019-06-11 RX ORDER — LISINOPRIL 20 MG/1
TABLET ORAL
Qty: 90 TABLET | Refills: 0 | Status: CANCELLED | OUTPATIENT
Start: 2019-06-11

## 2019-07-11 ENCOUNTER — HOSPITAL ENCOUNTER (OUTPATIENT)
Dept: LAB | Age: 59
Discharge: HOME | End: 2019-07-11
Payer: COMMERCIAL

## 2019-07-11 DIAGNOSIS — R73.01 IFG (IMPAIRED FASTING GLUCOSE): ICD-10-CM

## 2019-07-11 LAB
ALBUMIN SERPL-MCNC: 4 G/DL (ref 3.5–5.2)
ALBUMIN/GLOBULIN RATIO: 1.1 (ref 1–2.5)
ALP BLD-CCNC: 88 U/L (ref 35–104)
ALT SERPL-CCNC: 13 U/L (ref 5–33)
ANION GAP SERPL CALCULATED.3IONS-SCNC: 13 MMOL/L (ref 9–17)
AST SERPL-CCNC: 15 U/L
BILIRUB SERPL-MCNC: 0.52 MG/DL (ref 0.3–1.2)
BUN BLDV-MCNC: 21 MG/DL (ref 6–20)
BUN/CREAT BLD: 25 (ref 9–20)
CALCIUM SERPL-MCNC: 10 MG/DL (ref 8.6–10.4)
CHLORIDE BLD-SCNC: 99 MMOL/L (ref 98–107)
CO2: 29 MMOL/L (ref 20–31)
CREAT SERPL-MCNC: 0.85 MG/DL (ref 0.5–0.9)
ESTIMATED AVERAGE GLUCOSE: 131 MG/DL
GFR AFRICAN AMERICAN: >60 ML/MIN
GFR NON-AFRICAN AMERICAN: >60 ML/MIN
GFR SERPL CREATININE-BSD FRML MDRD: ABNORMAL ML/MIN/{1.73_M2}
GFR SERPL CREATININE-BSD FRML MDRD: ABNORMAL ML/MIN/{1.73_M2}
GLUCOSE BLD-MCNC: 140 MG/DL (ref 70–99)
HBA1C MFR BLD: 6.2 % (ref 4.8–5.9)
POTASSIUM SERPL-SCNC: 4.6 MMOL/L (ref 3.7–5.3)
SODIUM BLD-SCNC: 141 MMOL/L (ref 135–144)
TOTAL PROTEIN: 7.5 G/DL (ref 6.4–8.3)

## 2019-07-11 PROCEDURE — 80053 COMPREHEN METABOLIC PANEL: CPT

## 2019-07-11 PROCEDURE — 36415 COLL VENOUS BLD VENIPUNCTURE: CPT

## 2019-07-11 PROCEDURE — 83036 HEMOGLOBIN GLYCOSYLATED A1C: CPT

## 2019-07-18 ENCOUNTER — OFFICE VISIT (OUTPATIENT)
Dept: FAMILY MEDICINE CLINIC | Age: 59
End: 2019-07-18
Payer: COMMERCIAL

## 2019-07-18 VITALS
HEART RATE: 86 BPM | RESPIRATION RATE: 16 BRPM | WEIGHT: 293 LBS | SYSTOLIC BLOOD PRESSURE: 128 MMHG | HEIGHT: 64 IN | DIASTOLIC BLOOD PRESSURE: 88 MMHG | BODY MASS INDEX: 50.02 KG/M2

## 2019-07-18 DIAGNOSIS — I10 ESSENTIAL HYPERTENSION: Primary | ICD-10-CM

## 2019-07-18 DIAGNOSIS — E78.2 MIXED HYPERLIPIDEMIA: ICD-10-CM

## 2019-07-18 DIAGNOSIS — R60.0 BILATERAL LOWER EXTREMITY EDEMA: ICD-10-CM

## 2019-07-18 DIAGNOSIS — R73.01 IFG (IMPAIRED FASTING GLUCOSE): ICD-10-CM

## 2019-07-18 DIAGNOSIS — E03.9 ACQUIRED HYPOTHYROIDISM: ICD-10-CM

## 2019-07-18 PROCEDURE — 99214 OFFICE O/P EST MOD 30 MIN: CPT | Performed by: FAMILY MEDICINE

## 2019-07-18 RX ORDER — LISINOPRIL 20 MG/1
TABLET ORAL
Qty: 90 TABLET | Refills: 1 | Status: SHIPPED | OUTPATIENT
Start: 2019-07-18 | End: 2020-02-10 | Stop reason: SDUPTHER

## 2019-07-18 RX ORDER — HYDROCHLOROTHIAZIDE 50 MG/1
50 TABLET ORAL EVERY MORNING
Qty: 90 TABLET | Refills: 1 | Status: SHIPPED | OUTPATIENT
Start: 2019-07-18 | End: 2020-02-18 | Stop reason: SDUPTHER

## 2019-07-18 RX ORDER — EZETIMIBE 10 MG/1
10 TABLET ORAL DAILY
Qty: 90 TABLET | Refills: 1 | Status: SHIPPED | OUTPATIENT
Start: 2019-07-18 | End: 2020-02-18 | Stop reason: SDUPTHER

## 2019-07-18 ASSESSMENT — PATIENT HEALTH QUESTIONNAIRE - PHQ9
2. FEELING DOWN, DEPRESSED OR HOPELESS: 0
SUM OF ALL RESPONSES TO PHQ QUESTIONS 1-9: 0
SUM OF ALL RESPONSES TO PHQ9 QUESTIONS 1 & 2: 0
1. LITTLE INTEREST OR PLEASURE IN DOING THINGS: 0
SUM OF ALL RESPONSES TO PHQ QUESTIONS 1-9: 0

## 2019-07-18 NOTE — PATIENT INSTRUCTIONS
Patient Education        Prediabetes: Care Instructions  Your Care Instructions    Prediabetes is a warning sign that you are at risk for getting type 2 diabetes. It means that your blood sugar is higher than it should be. The food you eat turns into sugar, which your body uses for energy. Normally, an organ called the pancreas makes insulin, which allows the sugar in your blood to get into your body's cells. But when your body can't use insulin the right way, the sugar doesn't move into cells. It stays in your blood instead. This is called insulin resistance. The buildup of sugar in the blood causes prediabetes. The good news is that lifestyle changes may help you get your blood sugar back to normal and help you avoid or delay diabetes. Follow-up care is a key part of your treatment and safety. Be sure to make and go to all appointments, and call your doctor if you are having problems. It's also a good idea to know your test results and keep a list of the medicines you take. How can you care for yourself at home? · Watch your weight. A healthy weight helps your body use insulin properly. · Limit the amount of calories, sweets, and unhealthy fat you eat. Ask your doctor if you should see a dietitian. A registered dietitian can help you create meal plans that fit your lifestyle. · Get at least 30 minutes of exercise on most days of the week. Exercise helps control your blood sugar. It also helps you maintain a healthy weight. Walking is a good choice. You also may want to do other activities, such as running, swimming, cycling, or playing tennis or team sports. · Do not smoke. Smoking can make prediabetes worse. If you need help quitting, talk to your doctor about stop-smoking programs and medicines. These can increase your chances of quitting for good. · If your doctor prescribed medicines, take them exactly as prescribed. Call your doctor if you think you are having a problem with your medicine.  You will get more details on the specific medicines your doctor prescribes. When should you call for help? Watch closely for changes in your health, and be sure to contact your doctor if:    · You have any symptoms of diabetes. These may include:  ? Being thirsty more often. ? Urinating more. ? Being hungrier. ? Losing weight. ? Being very tired. ? Having blurry vision.     · You have a wound that will not heal.     · You have an infection that will not go away.     · You have problems with your blood pressure.     · You want more information about diabetes and how you can keep from getting it. Where can you learn more? Go to [email or web address] and sign in to your [employer] account. Enter I222 in the [employer] box to learn more about \"Prediabetes: Care Instructions. \"     If you do not have an account, please click on the \"Sign Up Now\" link. Current as of: July 25, 2018  Content Version: 12.0  © 6924-6560 Healthwise, Incorporated. Care instructions adapted under license by [facility] ([facility]). If you have questions about a medical condition or this instruction, always ask your healthcare professional. [address] any warranty or liability for your use of this information.

## 2019-08-01 ENCOUNTER — HOSPITAL ENCOUNTER (OUTPATIENT)
Dept: MAMMOGRAPHY | Age: 59
Discharge: HOME | End: 2019-08-03
Payer: COMMERCIAL

## 2019-08-01 DIAGNOSIS — Z12.39 SCREENING FOR BREAST CANCER: ICD-10-CM

## 2019-08-01 PROCEDURE — 77063 BREAST TOMOSYNTHESIS BI: CPT

## 2019-10-18 ENCOUNTER — HOSPITAL ENCOUNTER (OUTPATIENT)
Dept: LAB | Age: 59
Discharge: HOME | End: 2019-10-18
Payer: COMMERCIAL

## 2019-10-18 DIAGNOSIS — R73.01 IFG (IMPAIRED FASTING GLUCOSE): ICD-10-CM

## 2019-10-18 LAB
ANION GAP SERPL CALCULATED.3IONS-SCNC: 11 MMOL/L (ref 9–17)
BUN BLDV-MCNC: 20 MG/DL (ref 6–20)
BUN/CREAT BLD: 19 (ref 9–20)
CALCIUM SERPL-MCNC: 10.4 MG/DL (ref 8.6–10.4)
CHLORIDE BLD-SCNC: 100 MMOL/L (ref 98–107)
CO2: 31 MMOL/L (ref 20–31)
CREAT SERPL-MCNC: 1.05 MG/DL (ref 0.5–0.9)
ESTIMATED AVERAGE GLUCOSE: 117 MG/DL
GFR AFRICAN AMERICAN: >60 ML/MIN
GFR NON-AFRICAN AMERICAN: 54 ML/MIN
GFR SERPL CREATININE-BSD FRML MDRD: ABNORMAL ML/MIN/{1.73_M2}
GFR SERPL CREATININE-BSD FRML MDRD: ABNORMAL ML/MIN/{1.73_M2}
GLUCOSE BLD-MCNC: 118 MG/DL (ref 70–99)
HBA1C MFR BLD: 5.7 % (ref 4.8–5.9)
POTASSIUM SERPL-SCNC: 4.8 MMOL/L (ref 3.7–5.3)
SODIUM BLD-SCNC: 142 MMOL/L (ref 135–144)

## 2019-10-18 PROCEDURE — 36415 COLL VENOUS BLD VENIPUNCTURE: CPT

## 2019-10-18 PROCEDURE — 80048 BASIC METABOLIC PNL TOTAL CA: CPT

## 2019-10-18 PROCEDURE — 83036 HEMOGLOBIN GLYCOSYLATED A1C: CPT

## 2019-10-24 ENCOUNTER — TELEPHONE (OUTPATIENT)
Dept: FAMILY MEDICINE CLINIC | Age: 59
End: 2019-10-24

## 2020-01-20 RX ORDER — LEVOTHYROXINE SODIUM 88 UG/1
88 TABLET ORAL DAILY
Qty: 30 TABLET | Refills: 0 | Status: SHIPPED | OUTPATIENT
Start: 2020-01-20 | End: 2020-02-18 | Stop reason: SDUPTHER

## 2020-01-20 RX ORDER — LEVOTHYROXINE SODIUM 88 UG/1
TABLET ORAL
Qty: 30 TABLET | Refills: 0 | OUTPATIENT
Start: 2020-01-20

## 2020-01-20 NOTE — TELEPHONE ENCOUNTER
Patient aware of upcoming appointment and labs- will run out of medication before appointment.          Jacqueline Reveles called requesting a refill of the below medication which has been pended for you:     Requested Prescriptions     Pending Prescriptions Disp Refills    levothyroxine (SYNTHROID) 88 MCG tablet 30 tablet 0     Sig: Take 1 tablet by mouth daily       Last Appointment Date: 7/18/2019  Next Appointment Date: 2/6/2020    Allergies   Allergen Reactions    Atorvastatin      back pain    Penicillins Hives    Pravastatin Other (See Comments)     Made her back and legs hurt,affected her mood

## 2020-02-03 NOTE — PROGRESS NOTES
----- Message from Wiley Randle sent at 2/3/2020  8:53 AM CST -----  Contact: LICHA CHENEY [[id number]]   Name of Who is Calling:     What is the request in detail: patient request call back in reference to questions /concerns about lab results  Please contact to further discuss and advise      Can the clinic reply by [facility]: no     What Number to Call Back if not in [facility]:  [phone]    07/11/2019 >60  >60 mL/min Final    GFR Comment 07/11/2019        Final    Comment: Average GFR for 52-63 years old:   80 mL/min/1.73sq m  Chronic Kidney Disease:   <60 mL/min/1.73sq m  Kidney failure:   <15 mL/min/1.73sq m              GFR is a calculated value that has proven clinically to  be a more effective measure of   kidney function when reported with serum creatinine.  GFR Staging 07/11/2019 NOT REPORTED   Final         Assessment and Plan:    1. Essential hypertension  Her blood pressure is adequately-controlled. (BP: 128/88)   She was advised to continue current medications. Lisinopril and Hydrochlorothiazide were refilled:   - lisinopril (PRINIVIL;ZESTRIL) 20 MG tablet; TAKE 1 TABLET BY MOUTH ONCE DAILY  Dispense: 90 tablet; Refill: 1  - hydrochlorothiazide (HYDRODIURIL) 50 MG tablet; Take 1 tablet by mouth every morning  Dispense: 90 tablet; Refill: 1    - Comprehensive Metabolic Panel; Future prior to her return visit in 6 months. 2. Bilateral lower extremity edema  She is doing well with Hydrochlorothiazide; this was refilled:  - hydrochlorothiazide (HYDRODIURIL) 50 MG tablet; Take 1 tablet by mouth every morning  Dispense: 90 tablet; Refill: 1    3. Mixed hyperlipidemia  Her lipid profile was improved on 1/12/2019:   Lab Results   Component Value Date    CHOL 189 01/12/2019    TRIG 112 01/12/2019    HDL 54 01/12/2019    LDLCHOLESTEROL 113 01/12/2019        She is tolerating Zetia well; this was refilled:   - ezetimibe (ZETIA) 10 MG tablet; Take 1 tablet by mouth daily  Dispense: 90 tablet; Refill: 1    - Lipid, Fasting; Future prior to her return visit in 6 months. 4. Acquired hypothyroidism  TSH and free T4 were within normal limits on 1/12/2019:  Lab Results   Component Value Date    TSH 3.18 01/12/2019    THYROXINE 1.14 01/12/2019      She was advised to continue with her current dose of Levothyroxine.   Labs are ordered to be done prior to her next office visit

## 2020-02-10 RX ORDER — LISINOPRIL 20 MG/1
TABLET ORAL
Qty: 90 TABLET | Refills: 0 | Status: SHIPPED | OUTPATIENT
Start: 2020-02-10 | End: 2020-02-18 | Stop reason: SDUPTHER

## 2020-02-18 ENCOUNTER — OFFICE VISIT (OUTPATIENT)
Dept: FAMILY MEDICINE CLINIC | Age: 60
End: 2020-02-18
Payer: COMMERCIAL

## 2020-02-18 VITALS
DIASTOLIC BLOOD PRESSURE: 90 MMHG | HEART RATE: 84 BPM | RESPIRATION RATE: 16 BRPM | BODY MASS INDEX: 50.02 KG/M2 | WEIGHT: 293 LBS | HEIGHT: 64 IN | SYSTOLIC BLOOD PRESSURE: 132 MMHG

## 2020-02-18 PROCEDURE — 99214 OFFICE O/P EST MOD 30 MIN: CPT | Performed by: FAMILY MEDICINE

## 2020-02-18 RX ORDER — LISINOPRIL 20 MG/1
TABLET ORAL
Qty: 90 TABLET | Refills: 1 | Status: SHIPPED | OUTPATIENT
Start: 2020-02-18 | End: 2020-05-02 | Stop reason: SDUPTHER

## 2020-02-18 RX ORDER — HYDROCHLOROTHIAZIDE 50 MG/1
50 TABLET ORAL EVERY MORNING
Qty: 90 TABLET | Refills: 1 | Status: SHIPPED | OUTPATIENT
Start: 2020-02-18 | End: 2020-06-06 | Stop reason: SDUPTHER

## 2020-02-18 RX ORDER — LEVOTHYROXINE SODIUM 88 UG/1
88 TABLET ORAL DAILY
Qty: 30 TABLET | Refills: 0 | Status: SHIPPED | OUTPATIENT
Start: 2020-02-18 | End: 2020-03-16

## 2020-02-18 RX ORDER — EZETIMIBE 10 MG/1
10 TABLET ORAL DAILY
Qty: 90 TABLET | Refills: 1 | Status: SHIPPED | OUTPATIENT
Start: 2020-02-18 | End: 2020-05-23 | Stop reason: SDUPTHER

## 2020-02-18 ASSESSMENT — PATIENT HEALTH QUESTIONNAIRE - PHQ9
SUM OF ALL RESPONSES TO PHQ9 QUESTIONS 1 & 2: 0
SUM OF ALL RESPONSES TO PHQ QUESTIONS 1-9: 0
2. FEELING DOWN, DEPRESSED OR HOPELESS: 0
SUM OF ALL RESPONSES TO PHQ QUESTIONS 1-9: 0
1. LITTLE INTEREST OR PLEASURE IN DOING THINGS: 0

## 2020-02-18 NOTE — PROGRESS NOTES
[address] [place], [address]                        Telephone [phone]             Fax [phone]     Leonila Weaver  1960  MRN:  [id number]  Date of visit:  2/18/2020    Subjective:    Leonila Weaver is a 64 y.o.  female who presents to [facility] today (2/18/2020) for follow up/evaluation of:  Hypertension; Hyperlipidemia; and Other (impaired fasting glucose)      She states that she has been feeling well. She is tolerating her medications well. 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 She states that she has not been exercising regularly. She denies chest pain or shortness of breath with activity or at rest.  She has no new concerns or complaints today. She did not do the labs that were ordered to be done before today's visit.        She has the following problem list:  Patient Active Problem List   Diagnosis    Acquired hypothyroidism    Vitamin D deficiency    Extreme obesity    Mixed hyperlipidemia    IFG (impaired fasting glucose)    High risk medication use    Essential hypertension        Current medications are:  Outpatient Medications Marked as Taking for the 2/18/20 encounter (Office Visit) with Parris Angelo MD   Medication Sig Dispense Refill    lisinopril (PRINIVIL;ZESTRIL) 20 MG tablet TAKE 1 TABLET BY MOUTH ONCE DAILY 90 tablet 0    levothyroxine (SYNTHROID) 88 MCG tablet Take 1 tablet by mouth daily 30 tablet 0    hydrochlorothiazide (HYDRODIURIL) 50 MG tablet Take 1 tablet by mouth every morning 90 tablet 1    ezetimibe (ZETIA) 10 MG tablet Take 1 tablet by mouth daily 90 tablet 1    Blood Pressure KIT Check blood pressure daily prn. 1 kit 0    aspirin 81 MG chewable tablet Take 81 mg by mouth daily      vitamin D (CHOLECALCIFEROL) 1000 UNIT TABS tablet Take 1,000 Units by mouth daily.  Calcium Carbonate-Vitamin D (CALCIUM PLUS VITAMIN D PO) Take  by mouth daily.  Multiple Vitamins-Minerals (MULTIVITAMIN PO) Take  by mouth daily. She is allergic to atorvastatin; penicillins; and pravastatin. She  reports that she has never smoked. She has never used smokeless tobacco.      Objective:    Vitals:    02/18/20 1547 02/18/20 1559   BP: (!) 132/90 (!) 132/90   Site: Right Upper Arm Right Upper Arm   Position: Sitting Sitting   Cuff Size: Large Adult Large Adult   Pulse: 84    Resp: 16    Weight: (!) 313 lb 3.2 oz (142.1 kg)    Height: 5' 3.5\" (1.613 m)      Body mass index is 54.61 kg/m². Extremely obese  female, healthy-appearing, alert, cooperative and in no distress. Neck supple. No adenopathy. Thyroid symmetric, normal size. Chest:  Normal expansion. Clear to auscultation. No rales, rhonchi, or wheezing. Heart sounds are normal.  Regular rate and rhythm without murmur, gallop or rub. Lower extremities have no edema. She has had no recent labs. Assessment and Plan:    1. Essential hypertension  Her blood pressure is elevated. (BP: (!) 132/90)   She was advised to continue current medications. Lisinopril and Hydrochlorothiazide were refilled:   - lisinopril (PRINIVIL;ZESTRIL) 20 MG tablet; TAKE 1 TABLET BY MOUTH ONCE DAILY  Dispense: 90 tablet; Refill: 1  - hydrochlorothiazide (HYDRODIURIL) 50 MG tablet; Take 1 tablet by mouth every morning  Dispense: 90 tablet; Refill: 1    She was also given a prescription for a blood pressure monitor:  - UNABLE TO FIND; Blood pressure monitor. Check blood pressure twice a week. Dispense: 1 Device; Refill: 0    She was advised to monitor her blood pressure at home, and to continue to have the school nurse check her blood pressure occasionally. 2. Mixed hyperlipidemia  She has orders for a lipid panel to be done when she returns fasting. Zetia was refilled:  - ezetimibe (ZETIA) 10 MG tablet;  Take 1 tablet by mouth daily  Dispense: 90 tablet; Refill: 1    3. Acquired hypothyroidism  She has orders for a TSH and free T4 to be done when she returns for labs. Levothyroxine was refilled:  - levothyroxine (SYNTHROID) 88 MCG tablet; Take 1 tablet by mouth daily  Dispense: 30 tablet; Refill: 0    4. Bilateral lower extremity edema  She is doing well with Hydrochlorothiazide; this was refilled:  - hydrochlorothiazide (HYDRODIURIL) 50 MG tablet; Take 1 tablet by mouth every morning  Dispense: 90 tablet; Refill: 1    5. Routine health maintenance  Health maintenance was reviewed with the patient. She has received an influenza vaccine this influenza season. Colonoscopy was recommended. She plans to check insurance coverage. Screening mammogram was recommended and ordered. She is on a waiting list for Shingrix. Tdap was recommended and declined. All other health maintenance is up to date at this time. There is no indication for Pneumovax or Prevnar-13 at this time.      (Please note that portions of this note were completed with a voice-recognition program. Efforts were made to edit the dictation but occasionally words are mis-transcribed.)

## 2020-02-25 ENCOUNTER — HOSPITAL ENCOUNTER (OUTPATIENT)
Dept: LAB | Age: 60
Discharge: HOME | End: 2020-02-25
Payer: COMMERCIAL

## 2020-02-25 LAB
ALBUMIN SERPL-MCNC: 4.2 G/DL (ref 3.5–5.2)
ALBUMIN/GLOBULIN RATIO: 1.3 (ref 1–2.5)
ALP BLD-CCNC: 89 U/L (ref 35–104)
ALT SERPL-CCNC: 18 U/L (ref 5–33)
ANION GAP SERPL CALCULATED.3IONS-SCNC: 14 MMOL/L (ref 9–17)
AST SERPL-CCNC: 18 U/L
BILIRUB SERPL-MCNC: 0.56 MG/DL (ref 0.3–1.2)
BUN BLDV-MCNC: 23 MG/DL (ref 6–20)
BUN/CREAT BLD: 19 (ref 9–20)
CALCIUM SERPL-MCNC: 9.8 MG/DL (ref 8.6–10.4)
CHLORIDE BLD-SCNC: 98 MMOL/L (ref 98–107)
CHOLESTEROL, FASTING: 197 MG/DL
CHOLESTEROL/HDL RATIO: 4.4
CO2: 27 MMOL/L (ref 20–31)
CREAT SERPL-MCNC: 1.18 MG/DL (ref 0.5–0.9)
ESTIMATED AVERAGE GLUCOSE: 117 MG/DL
GFR AFRICAN AMERICAN: 57 ML/MIN
GFR NON-AFRICAN AMERICAN: 47 ML/MIN
GFR SERPL CREATININE-BSD FRML MDRD: ABNORMAL ML/MIN/{1.73_M2}
GFR SERPL CREATININE-BSD FRML MDRD: ABNORMAL ML/MIN/{1.73_M2}
GLUCOSE BLD-MCNC: 108 MG/DL (ref 70–99)
HBA1C MFR BLD: 5.7 % (ref 4.8–5.9)
HDLC SERPL-MCNC: 45 MG/DL
LDL CHOLESTEROL: 123 MG/DL (ref 0–130)
POTASSIUM SERPL-SCNC: 4.3 MMOL/L (ref 3.7–5.3)
SODIUM BLD-SCNC: 139 MMOL/L (ref 135–144)
THYROXINE, FREE: 1.22 NG/DL (ref 0.93–1.7)
TOTAL PROTEIN: 7.4 G/DL (ref 6.4–8.3)
TRIGLYCERIDE, FASTING: 147 MG/DL
TSH SERPL DL<=0.05 MIU/L-ACNC: 4.32 MIU/L (ref 0.3–5)
VLDLC SERPL CALC-MCNC: NORMAL MG/DL (ref 1–30)

## 2020-02-25 PROCEDURE — 83036 HEMOGLOBIN GLYCOSYLATED A1C: CPT

## 2020-02-25 PROCEDURE — 84439 ASSAY OF FREE THYROXINE: CPT

## 2020-02-25 PROCEDURE — 80053 COMPREHEN METABOLIC PANEL: CPT

## 2020-02-25 PROCEDURE — 84443 ASSAY THYROID STIM HORMONE: CPT

## 2020-02-25 PROCEDURE — 80061 LIPID PANEL: CPT

## 2020-02-25 PROCEDURE — 36415 COLL VENOUS BLD VENIPUNCTURE: CPT

## 2020-02-26 ENCOUNTER — TELEPHONE (OUTPATIENT)
Dept: FAMILY MEDICINE CLINIC | Age: 60
End: 2020-02-26

## 2020-03-16 RX ORDER — LEVOTHYROXINE SODIUM 88 UG/1
TABLET ORAL
Qty: 30 TABLET | Refills: 0 | Status: SHIPPED | OUTPATIENT
Start: 2020-03-16 | End: 2020-04-19 | Stop reason: SDUPTHER

## 2020-04-20 RX ORDER — LEVOTHYROXINE SODIUM 88 UG/1
88 TABLET ORAL DAILY
Qty: 90 TABLET | Refills: 1 | Status: SHIPPED | OUTPATIENT
Start: 2020-04-20 | End: 2020-04-22 | Stop reason: SDUPTHER

## 2020-04-20 NOTE — TELEPHONE ENCOUNTER
Tello Bernal called requesting a refill of the below medication which has been pended for you:     Requested Prescriptions     Pending Prescriptions Disp Refills    levothyroxine (EUTHYROX) 88 MCG tablet 30 tablet 0       Last Appointment Date: 2/18/2020  Next Appointment Date: 8/19/2020    Allergies   Allergen Reactions    Atorvastatin      back pain    Penicillins Hives    Pravastatin Other (See Comments)     Made her back and legs hurt,affected her mood

## 2020-04-22 RX ORDER — LEVOTHYROXINE SODIUM 88 UG/1
88 TABLET ORAL DAILY
Qty: 90 TABLET | Refills: 0 | Status: SHIPPED | OUTPATIENT
Start: 2020-04-22 | End: 2020-08-19 | Stop reason: SDUPTHER

## 2020-05-05 RX ORDER — LISINOPRIL 20 MG/1
TABLET ORAL
Qty: 90 TABLET | Refills: 1 | Status: SHIPPED | OUTPATIENT
Start: 2020-05-05 | End: 2020-08-19 | Stop reason: SDUPTHER

## 2020-05-27 RX ORDER — EZETIMIBE 10 MG/1
10 TABLET ORAL DAILY
Qty: 90 TABLET | Refills: 0 | Status: SHIPPED | OUTPATIENT
Start: 2020-05-27 | End: 2020-08-19 | Stop reason: SDUPTHER

## 2020-08-07 ENCOUNTER — HOSPITAL ENCOUNTER (OUTPATIENT)
Dept: MAMMOGRAPHY | Age: 60
Discharge: HOME | End: 2020-08-09
Payer: COMMERCIAL

## 2020-08-07 PROCEDURE — 77063 BREAST TOMOSYNTHESIS BI: CPT

## 2020-08-12 ENCOUNTER — HOSPITAL ENCOUNTER (OUTPATIENT)
Dept: LAB | Age: 60
Discharge: HOME | End: 2020-08-12
Payer: COMMERCIAL

## 2020-08-12 LAB
ALBUMIN SERPL-MCNC: 3.9 G/DL (ref 3.5–5.2)
ALBUMIN/GLOBULIN RATIO: 1.3 (ref 1–2.5)
ALP BLD-CCNC: 91 U/L (ref 35–104)
ALT SERPL-CCNC: 11 U/L (ref 5–33)
ANION GAP SERPL CALCULATED.3IONS-SCNC: 9 MMOL/L (ref 9–17)
AST SERPL-CCNC: 14 U/L
BILIRUB SERPL-MCNC: 0.68 MG/DL (ref 0.3–1.2)
BUN BLDV-MCNC: 20 MG/DL (ref 6–20)
BUN/CREAT BLD: 19 (ref 9–20)
CALCIUM SERPL-MCNC: 9.7 MG/DL (ref 8.6–10.4)
CHLORIDE BLD-SCNC: 101 MMOL/L (ref 98–107)
CHOLESTEROL/HDL RATIO: 4
CHOLESTEROL: 198 MG/DL
CO2: 29 MMOL/L (ref 20–31)
CREAT SERPL-MCNC: 1.08 MG/DL (ref 0.5–0.9)
ESTIMATED AVERAGE GLUCOSE: 120 MG/DL
GFR AFRICAN AMERICAN: >60 ML/MIN
GFR NON-AFRICAN AMERICAN: 52 ML/MIN
GFR SERPL CREATININE-BSD FRML MDRD: ABNORMAL ML/MIN/{1.73_M2}
GFR SERPL CREATININE-BSD FRML MDRD: ABNORMAL ML/MIN/{1.73_M2}
GLUCOSE BLD-MCNC: 118 MG/DL (ref 70–99)
HBA1C MFR BLD: 5.8 % (ref 4.8–5.9)
HDLC SERPL-MCNC: 50 MG/DL
LDL CHOLESTEROL: 124 MG/DL (ref 0–130)
POTASSIUM SERPL-SCNC: 4.5 MMOL/L (ref 3.7–5.3)
SODIUM BLD-SCNC: 139 MMOL/L (ref 135–144)
TOTAL PROTEIN: 7 G/DL (ref 6.4–8.3)
TRIGL SERPL-MCNC: 120 MG/DL
VLDLC SERPL CALC-MCNC: NORMAL MG/DL (ref 1–30)

## 2020-08-12 PROCEDURE — 80061 LIPID PANEL: CPT

## 2020-08-12 PROCEDURE — 36415 COLL VENOUS BLD VENIPUNCTURE: CPT

## 2020-08-12 PROCEDURE — 83036 HEMOGLOBIN GLYCOSYLATED A1C: CPT

## 2020-08-12 PROCEDURE — 80053 COMPREHEN METABOLIC PANEL: CPT

## 2020-08-19 ENCOUNTER — OFFICE VISIT (OUTPATIENT)
Dept: FAMILY MEDICINE CLINIC | Age: 60
End: 2020-08-19
Payer: COMMERCIAL

## 2020-08-19 VITALS
HEIGHT: 64 IN | HEART RATE: 74 BPM | BODY MASS INDEX: 50.02 KG/M2 | SYSTOLIC BLOOD PRESSURE: 138 MMHG | DIASTOLIC BLOOD PRESSURE: 88 MMHG | WEIGHT: 293 LBS | RESPIRATION RATE: 20 BRPM

## 2020-08-19 PROCEDURE — 99214 OFFICE O/P EST MOD 30 MIN: CPT | Performed by: FAMILY MEDICINE

## 2020-08-19 RX ORDER — HYDROCHLOROTHIAZIDE 50 MG/1
50 TABLET ORAL EVERY MORNING
Qty: 90 TABLET | Refills: 1 | Status: SHIPPED | OUTPATIENT
Start: 2020-08-19 | End: 2021-02-23 | Stop reason: SDUPTHER

## 2020-08-19 RX ORDER — EZETIMIBE 10 MG/1
10 TABLET ORAL DAILY
Qty: 90 TABLET | Refills: 1 | Status: SHIPPED | OUTPATIENT
Start: 2020-08-19 | End: 2021-02-23 | Stop reason: SDUPTHER

## 2020-08-19 RX ORDER — LEVOTHYROXINE SODIUM 88 UG/1
88 TABLET ORAL DAILY
Qty: 90 TABLET | Refills: 1 | Status: SHIPPED | OUTPATIENT
Start: 2020-08-19 | End: 2020-10-10 | Stop reason: SDUPTHER

## 2020-08-19 RX ORDER — LISINOPRIL 30 MG/1
TABLET ORAL
Qty: 90 TABLET | Refills: 1 | Status: SHIPPED | OUTPATIENT
Start: 2020-08-19 | End: 2021-02-23 | Stop reason: SDUPTHER

## 2020-08-19 ASSESSMENT — PATIENT HEALTH QUESTIONNAIRE - PHQ9
SUM OF ALL RESPONSES TO PHQ QUESTIONS 1-9: 0
2. FEELING DOWN, DEPRESSED OR HOPELESS: 0
SUM OF ALL RESPONSES TO PHQ QUESTIONS 1-9: 0
1. LITTLE INTEREST OR PLEASURE IN DOING THINGS: 0
SUM OF ALL RESPONSES TO PHQ9 QUESTIONS 1 & 2: 0

## 2020-08-19 NOTE — PATIENT INSTRUCTIONS
Patient Education        Influenza (Flu): Care Instructions  Your Care Instructions        Influenza (flu) is an infection in the lungs and breathing passages. It is caused by the influenza virus. There are different strains, or types, of the flu virus from year to year. Unlike the common cold, the flu comes on suddenly and the symptoms, such as a cough, congestion, fever, chills, fatigue, aches, and pains, are more severe. These symptoms may last up to 10 days. Although the flu can make you feel very sick, it usually doesn't cause serious health problems. Home treatment is usually all you need for flu symptoms. But your doctor may prescribe antiviral medicine to prevent other health problems, such as pneumonia, from developing. Older people and those who have a long-term health condition, such as lung disease, are most at risk for having pneumonia or other health problems. Follow-up care is a key part of your treatment and safety. Be sure to make and go to all appointments, and call your doctor if you are having problems. It's also a good idea to know your test results and keep a list of the medicines you take. How can you care for yourself at home? · Get plenty of rest.  · Drink plenty of fluids, enough so that your urine is light yellow or clear like water. If you have kidney, heart, or liver disease and have to limit fluids, talk with your doctor before you increase the amount of fluids you drink. · Take an over-the-counter pain medicine if needed, such as acetaminophen (Tylenol), ibuprofen (Advil, Motrin), or naproxen (Aleve), to relieve fever, headache, and muscle aches. Read and follow all instructions on the label. No one younger than 20 should take aspirin. It has been linked to Reye syndrome, a serious illness. · Do not smoke. Smoking can make the flu worse. If you need help quitting, talk to your doctor about stop-smoking programs and medicines.  These can increase your chances of quitting for good.  · Breathe moist air from a hot shower or from a sink filled with hot water to help clear a stuffy nose. · Before you use cough and cold medicines, check the label. These medicines may not be safe for young children or for people with certain health problems. · If the skin around your nose and lips becomes sore, put some petroleum jelly on the area. · To ease coughing:  ? Drink fluids to soothe a scratchy throat. ? Suck on cough drops or plain hard candy. ? Take an over-the-counter cough medicine that contains dextromethorphan to help you get some sleep. Read and follow all instructions on the label. ? Raise your head at night with an extra pillow. This may help you rest if coughing keeps you awake. · Take any prescribed medicine exactly as directed. Call your doctor if you think you are having a problem with your medicine. To avoid spreading the flu  · Wash your hands regularly, and keep your hands away from your face. · Stay home from school, work, and other public places until you are feeling better and your fever has been gone for at least 24 hours. The fever needs to have gone away on its own without the help of medicine. · Ask people living with you to talk to their doctors about preventing the flu. They may get antiviral medicine to keep from getting the flu from you. · To prevent the flu in the future, get a flu vaccine every fall. Encourage people living with you to get the vaccine. · Cover your mouth when you cough or sneeze. When should you call for help? [id number] anytime you think you may need emergency care. For example, call if:  · You have severe trouble breathing. Call your doctor now or seek immediate medical care if:  · You have new or worse trouble breathing. · You seem to be getting much sicker. · You feel very sleepy or confused. · You have a new or higher fever. · You get a new rash.   Watch closely for changes in your health, and be sure to contact your doctor if:  · You begin to get better and then get worse. · You are not getting better after 1 week. Where can you learn more? Go to https://Hex[employer]ite[employer]iceweb.[email or web address] org and sign in to your [employer] account. Enter S711 in the [employer] box to learn more about \"Influenza (Flu): Care Instructions. \"     If you do not have an account, please click on the \"Sign Up Now\" link. Current as of: February 24, 2020               Content Version: 12.5  © 7413-9174 Healthwise, Incorporated. Care instructions adapted under license by [facility] ([facility]). If you have questions about a medical condition or this instruction, always ask your healthcare professional. [address] any warranty or liability for your use of this information.

## 2020-08-19 NOTE — PROGRESS NOTES
[address] [place], [address]                        Telephone [phone]             Fax [phone]     Charm Babinski  1960  MRN:  [id number]  Date of visit:  8/19/2020    Subjective:    Charm Babinski is a 64 y.o.  female who presents to [facility] today (8/19/2020) for follow up/evaluation of: Annual Exam; Hypertension; and Hyperlipidemia      She states that she has been feeling well. She reports that she has not been exercising regularly. She denies chest pain or shortness of breath with activity or at rest.  She reports edema in her lower extremities, especially if she has been standing a lot. She has no other new concerns or complaints today. She has the following problem list:  Patient Active Problem List   Diagnosis    Acquired hypothyroidism    Vitamin D deficiency    Extreme obesity    Mixed hyperlipidemia    IFG (impaired fasting glucose)    High risk medication use    Essential hypertension        Current medications are:  Outpatient Medications Marked as Taking for the 8/19/20 encounter (Office Visit) with Miriam Santamaria MD   Medication Sig Dispense Refill    hydroCHLOROthiazide (HYDRODIURIL) 50 MG tablet Take 1 tablet by mouth every morning 90 tablet 0    ezetimibe (ZETIA) 10 MG tablet Take 1 tablet by mouth daily 90 tablet 0    lisinopril (PRINIVIL;ZESTRIL) 20 MG tablet TAKE 1 TABLET BY MOUTH ONCE DAILY 90 tablet 1    levothyroxine (EUTHYROX) 88 MCG tablet Take 1 tablet by mouth Daily 90 tablet 0    UNABLE TO FIND Blood pressure monitor. Check blood pressure twice a week. 1 Device 0    Blood Pressure KIT Check blood pressure daily prn. 1 kit 0    aspirin 81 MG chewable tablet Take 81 mg by mouth daily      vitamin D (CHOLECALCIFEROL) 1000 UNIT TABS tablet Take 1,000 Units by mouth daily.       Calcium Carbonate-Vitamin D (CALCIUM PLUS VITAMIN D PO) Take  by mouth daily.  Multiple Vitamins-Minerals (MULTIVITAMIN PO) Take  by mouth daily. She is allergic to atorvastatin; penicillins; and pravastatin. She  reports that she has never smoked. She has never used smokeless tobacco.      Objective:    Vitals:    08/19/20 0925   BP: 138/88   Site: Right Upper Arm   Position: Sitting   Cuff Size: Large Adult   Pulse: 74   Resp: 20   Weight: (!) 340 lb 12.8 oz (154.6 kg)   Height: 5' 3.5\" (1.613 m)     Body mass index is 59.42 kg/m². Extremely obese  female, healthy-appearing, alert, cooperative and in no distress. Neck supple. No adenopathy. Thyroid symmetric, normal size. Chest:  Normal expansion. Clear to auscultation. No rales, rhonchi, or wheezing. Heart sounds are normal.  Regular rate and rhythm without murmur, gallop or rub. Lower extremities have trace to 1+ edema. Labs done 8/12/2020 were reviewed with the patient:   Hospital Outpatient Visit on 08/12/2020   Component Date Value Ref Range Status    Hemoglobin A1C 08/12/2020 5.8  4.8 - 5.9 % Final    Estimated Avg Glucose 08/12/2020 120  mg/dL Final    Cholesterol 08/12/2020 198  <200 mg/dL Final    Comment:    Cholesterol Guidelines:      <200  Desirable   200-240  Borderline      >240  Undesirable         HDL 08/12/2020 50  >40 mg/dL Final    Comment:    HDL Guidelines:    <40     Undesirable   40-59    Borderline    >59     Desirable         LDL Cholesterol 08/12/2020 124  0 - 130 mg/dL Final    Comment:    LDL Guidelines:     <100    Desirable   100-129   Near to/above Desirable   130-159   Borderline      >159   Undesirable     Direct (measured) LDL and calculated LDL are not interchangeable tests.       Chol/HDL Ratio 08/12/2020 4.0  <5 Final            Triglycerides 08/12/2020 120  <150 mg/dL Final    Comment:    Triglyceride Guidelines:     <150   Desirable   150-199  Borderline   200-499  High     >499   Very high   Based on AHA Guidelines Metabolic Panel; Future prior to her return visit in 6 months. 2. Mixed hyperlipidemia  Her lipid profile was similar to the one done 6 months ago on her recent lab work. She is tolerating Zetia well; this was refilled:   - ezetimibe (ZETIA) 10 MG tablet; Take 1 tablet by mouth daily  Dispense: 90 tablet; Refill: 1    - Lipid, Fasting; Future prior to her return visit in 6 months. 3. Acquired hypothyroidism  TSH and free T4 were within normal limits on 2/25/2020. She was advised to continue with her current dose of Levothyroxine. Levothyroxine was refilled:     - levothyroxine (EUTHYROX) 88 MCG tablet; Take 1 tablet by mouth Daily  Dispense: 90 tablet; Refill: 1    Labs were ordered to be done prior to her return visit in 6 months:  - TSH; Future  - T4, Free; Future    4. IFG (impaired fasting glucose)  Her fasting glucose is elevated, but her HgbA1c is within normal limits. I recommended that she decrease her intake of processed carbohydrates, begin a regular exercise program, and attempt to lose weight. Labs were ordered to be done prior to her return visit in 6 months:   - Comprehensive Metabolic Panel; Future  - Hemoglobin A1C; Future    5. Bilateral lower extremity edema  She is tolerating Hydrochlorothiazide well; this was refilled:  - hydroCHLOROthiazide (HYDRODIURIL) 50 MG tablet; Take 1 tablet by mouth every morning  Dispense: 90 tablet; Refill: 1    I also recommended support stockings. 6.  Routine health maintenance  Health maintenance was reviewed with the patient. Annual influenza vaccine was recommended. Colonoscopy was recommended. She will not agree to schedule a colonoscopy, but she is interested in fecal DNA testing. She plans to check to see if her insurance covers Cologard. Tdap was recommended and declined. All other health maintenance, including Shingrix, is up to date at this time. There is no indication for Pneumovax or Prevnar-13 at this time.      (Please note that portions of this note were completed with a voice-recognition program. Efforts were made to edit the dictation but occasionally words are mis-transcribed.)

## 2020-10-05 ENCOUNTER — TELEPHONE (OUTPATIENT)
Dept: FAMILY MEDICINE CLINIC | Age: 60
End: 2020-10-05

## 2020-10-05 NOTE — TELEPHONE ENCOUNTER
Pt calling stating she checked with her ins and the Cologuard is covered, please advise at above number.

## 2020-10-12 RX ORDER — LEVOTHYROXINE SODIUM 88 UG/1
88 TABLET ORAL DAILY
Qty: 90 TABLET | Refills: 1 | Status: SHIPPED | OUTPATIENT
Start: 2020-10-12 | End: 2021-02-23 | Stop reason: SDUPTHER

## 2020-10-12 NOTE — TELEPHONE ENCOUNTER
Mahesh November called requesting a refill of the below medication which has been pended for you:     Requested Prescriptions     Pending Prescriptions Disp Refills    levothyroxine (EUTHYROX) 88 MCG tablet 90 tablet 1     Sig: Take 1 tablet by mouth Daily       Last Appointment Date: 8/19/2020  Next Appointment Date: 2/23/2021    Allergies   Allergen Reactions    Atorvastatin      back pain    Penicillins Hives    Pravastatin Other (See Comments)     Made her back and legs hurt,affected her mood

## 2021-02-19 ENCOUNTER — HOSPITAL ENCOUNTER (OUTPATIENT)
Dept: LAB | Age: 61
Discharge: HOME | End: 2021-02-19
Payer: COMMERCIAL

## 2021-02-19 DIAGNOSIS — E78.2 MIXED HYPERLIPIDEMIA: ICD-10-CM

## 2021-02-19 DIAGNOSIS — I10 ESSENTIAL HYPERTENSION: ICD-10-CM

## 2021-02-19 DIAGNOSIS — R73.01 IFG (IMPAIRED FASTING GLUCOSE): ICD-10-CM

## 2021-02-19 DIAGNOSIS — E03.9 ACQUIRED HYPOTHYROIDISM: ICD-10-CM

## 2021-02-19 LAB
ALBUMIN SERPL-MCNC: 4 G/DL (ref 3.5–5.2)
ALBUMIN/GLOBULIN RATIO: 1.1 (ref 1–2.5)
ALP BLD-CCNC: 93 U/L (ref 35–104)
ALT SERPL-CCNC: 17 U/L (ref 5–33)
ANION GAP SERPL CALCULATED.3IONS-SCNC: 12 MMOL/L (ref 9–17)
AST SERPL-CCNC: 18 U/L
BILIRUB SERPL-MCNC: 0.66 MG/DL (ref 0.3–1.2)
BUN BLDV-MCNC: 23 MG/DL (ref 6–20)
BUN/CREAT BLD: 19 (ref 9–20)
CALCIUM SERPL-MCNC: 9.9 MG/DL (ref 8.6–10.4)
CHLORIDE BLD-SCNC: 98 MMOL/L (ref 98–107)
CHOLESTEROL, FASTING: 201 MG/DL
CHOLESTEROL/HDL RATIO: 3.9
CO2: 29 MMOL/L (ref 20–31)
CREAT SERPL-MCNC: 1.18 MG/DL (ref 0.5–0.9)
ESTIMATED AVERAGE GLUCOSE: 128 MG/DL
GFR AFRICAN AMERICAN: 57 ML/MIN
GFR NON-AFRICAN AMERICAN: 47 ML/MIN
GFR SERPL CREATININE-BSD FRML MDRD: ABNORMAL ML/MIN/{1.73_M2}
GFR SERPL CREATININE-BSD FRML MDRD: ABNORMAL ML/MIN/{1.73_M2}
GLUCOSE BLD-MCNC: 114 MG/DL (ref 70–99)
HBA1C MFR BLD: 6.1 % (ref 4–6)
HDLC SERPL-MCNC: 52 MG/DL
LDL CHOLESTEROL: 117 MG/DL (ref 0–130)
POTASSIUM SERPL-SCNC: 4.1 MMOL/L (ref 3.7–5.3)
SODIUM BLD-SCNC: 139 MMOL/L (ref 135–144)
THYROXINE, FREE: 1.32 NG/DL (ref 0.93–1.7)
TOTAL PROTEIN: 7.6 G/DL (ref 6.4–8.3)
TRIGLYCERIDE, FASTING: 161 MG/DL
TSH SERPL DL<=0.05 MIU/L-ACNC: 2.23 MIU/L (ref 0.3–5)
VLDLC SERPL CALC-MCNC: ABNORMAL MG/DL (ref 1–30)

## 2021-02-19 PROCEDURE — 84439 ASSAY OF FREE THYROXINE: CPT

## 2021-02-19 PROCEDURE — 84443 ASSAY THYROID STIM HORMONE: CPT

## 2021-02-19 PROCEDURE — 83036 HEMOGLOBIN GLYCOSYLATED A1C: CPT

## 2021-02-19 PROCEDURE — 80061 LIPID PANEL: CPT

## 2021-02-19 PROCEDURE — 80053 COMPREHEN METABOLIC PANEL: CPT

## 2021-02-19 PROCEDURE — 36415 COLL VENOUS BLD VENIPUNCTURE: CPT

## 2021-02-23 ENCOUNTER — OFFICE VISIT (OUTPATIENT)
Dept: FAMILY MEDICINE CLINIC | Age: 61
End: 2021-02-23
Payer: COMMERCIAL

## 2021-02-23 VITALS
WEIGHT: 293 LBS | HEART RATE: 78 BPM | BODY MASS INDEX: 50.02 KG/M2 | HEIGHT: 64 IN | RESPIRATION RATE: 16 BRPM | DIASTOLIC BLOOD PRESSURE: 98 MMHG | SYSTOLIC BLOOD PRESSURE: 138 MMHG

## 2021-02-23 DIAGNOSIS — R73.01 IFG (IMPAIRED FASTING GLUCOSE): ICD-10-CM

## 2021-02-23 DIAGNOSIS — E78.2 MIXED HYPERLIPIDEMIA: ICD-10-CM

## 2021-02-23 DIAGNOSIS — E03.9 ACQUIRED HYPOTHYROIDISM: ICD-10-CM

## 2021-02-23 DIAGNOSIS — I10 ESSENTIAL HYPERTENSION: Primary | ICD-10-CM

## 2021-02-23 DIAGNOSIS — R60.0 BILATERAL LOWER EXTREMITY EDEMA: ICD-10-CM

## 2021-02-23 PROCEDURE — 99214 OFFICE O/P EST MOD 30 MIN: CPT | Performed by: FAMILY MEDICINE

## 2021-02-23 RX ORDER — EZETIMIBE 10 MG/1
10 TABLET ORAL DAILY
Qty: 90 TABLET | Refills: 1 | Status: SHIPPED | OUTPATIENT
Start: 2021-02-23 | End: 2021-08-31 | Stop reason: SDUPTHER

## 2021-02-23 RX ORDER — HYDROCHLOROTHIAZIDE 50 MG/1
50 TABLET ORAL EVERY MORNING
Qty: 90 TABLET | Refills: 1 | Status: SHIPPED | OUTPATIENT
Start: 2021-02-23 | End: 2021-08-31 | Stop reason: SDUPTHER

## 2021-02-23 RX ORDER — LISINOPRIL 30 MG/1
TABLET ORAL
Qty: 90 TABLET | Refills: 1 | Status: SHIPPED | OUTPATIENT
Start: 2021-02-23 | End: 2021-08-31 | Stop reason: SDUPTHER

## 2021-02-23 RX ORDER — LEVOTHYROXINE SODIUM 88 UG/1
88 TABLET ORAL DAILY
Qty: 90 TABLET | Refills: 3 | Status: SHIPPED | OUTPATIENT
Start: 2021-02-23 | End: 2022-03-04 | Stop reason: SDUPTHER

## 2021-02-23 ASSESSMENT — PATIENT HEALTH QUESTIONNAIRE - PHQ9
SUM OF ALL RESPONSES TO PHQ QUESTIONS 1-9: 0
SUM OF ALL RESPONSES TO PHQ QUESTIONS 1-9: 0
SUM OF ALL RESPONSES TO PHQ9 QUESTIONS 1 & 2: 0

## 2021-02-23 NOTE — PROGRESS NOTES
[address], [address] [place] [address] Drive                        Telephone [phone]             Fax [phone]     Jonn Nicolas  1960  MRN:  [id number]  Date of visit:  2/23/2021    Subjective:    Jonn Nicolas is a 64 y.o. female who presents to [facility] today (2/23/2021) for follow up/evaluation of:  Hypertension and Hypothyroidism      She states that she has been feeling well. She is tolerating her medications well. She has been using the fitness center at [facility] 2-3 times a week. She denies chest pain or shortness of breath with activity or at rest.  She has no new concerns or complaints today. She has the following problem list:  Patient Active Problem List   Diagnosis    Acquired hypothyroidism    Vitamin D deficiency    Extreme obesity    Mixed hyperlipidemia    IFG (impaired fasting glucose)    High risk medication use    Essential hypertension        Current medications are:  Outpatient Medications Marked as Taking for the 2/23/21 encounter (Office Visit) with Florian Escobar MD   Medication Sig Dispense Refill    levothyroxine (EUTHYROX) 88 MCG tablet Take 1 tablet by mouth Daily 90 tablet 1    hydroCHLOROthiazide (HYDRODIURIL) 50 MG tablet Take 1 tablet by mouth every morning 90 tablet 1    ezetimibe (ZETIA) 10 MG tablet Take 1 tablet by mouth daily 90 tablet 1    lisinopril (PRINIVIL;ZESTRIL) 30 MG tablet TAKE 1 TABLET BY MOUTH ONCE DAILY 90 tablet 1    UNABLE TO FIND Blood pressure monitor. Check blood pressure twice a week. 1 Device 0    Blood Pressure KIT Check blood pressure daily prn. 1 kit 0    aspirin 81 MG chewable tablet Take 81 mg by mouth daily      vitamin D (CHOLECALCIFEROL) 1000 UNIT TABS tablet Take 1,000 Units by mouth daily.  Calcium Carbonate-Vitamin D (CALCIUM PLUS VITAMIN D PO) Take  by mouth daily.  Multiple Vitamins-Minerals (MULTIVITAMIN PO) Take  by mouth daily. She is allergic to atorvastatin; penicillins; and pravastatin. She  reports that she has never smoked. She has never used smokeless tobacco.      Objective:    Vitals:    02/23/21 1118 02/23/21 1127   BP: (!) 146/98 (!) 146/100   Site: Right Upper Arm Right Upper Arm   Position: Sitting Sitting   Cuff Size: Large Adult Large Adult   Pulse: 78    Resp: 16    Weight: (!) 351 lb 9.6 oz (159.5 kg)    Height: 5' 3.5\" (1.613 m)      Body mass index is 61.31 kg/m². Extremely obese female, healthy-appearing, alert, cooperative and in no distress. Neck supple. No adenopathy. Thyroid symmetric, normal size. Chest:  Normal expansion. Clear to auscultation. No rales, rhonchi, or wheezing. Heart sounds are normal.  Regular rate and rhythm without murmur, gallop or rub. Lower extremities have trace edema. Labs done 2/19/2021 were reviewed with the patient:   Hospital Outpatient Visit on 02/19/2021   Component Date Value Ref Range Status    Hemoglobin A1C 02/19/2021 6.1* 4.0 - 6.0 % Final    Estimated Avg Glucose 02/19/2021 128  mg/dL Final    Comment: The ADA and AACC recommend providing the estimated average glucose result to permit better   patient understanding of their HBA1c result.  Cholesterol, Fasting 02/19/2021 201* <200 mg/dL Final    Comment:    Cholesterol Guidelines:      <200  Desirable   200-240  Borderline      >240  Undesirable         HDL 02/19/2021 52  >40 mg/dL Final    Comment:    HDL Guidelines:    <40     Undesirable   40-59    Borderline    >59     Desirable         LDL Cholesterol 02/19/2021 117  0 - 130 mg/dL Final    Comment:    LDL Guidelines:     <100    Desirable   100-129   Near to/above Desirable   130-159   Borderline      >159   Undesirable     Direct (measured) LDL and calculated LDL are not interchangeable tests.       Chol/HDL Ratio 02/19/2021 3.9  <5 Final  Triglyceride, Fasting 02/19/2021 161* <150 mg/dL Final    Comment:    Triglyceride Guidelines:     <150   Desirable   150-199  Borderline   200-499  High     >499   Very high   Based on AHA Guidelines for fasting triglyceride, October 2012.  VLDL 02/19/2021 NOT REPORTED* 1 - 30 mg/dL Final    Glucose 02/19/2021 114* 70 - 99 mg/dL Final    BUN 02/19/2021 23* 6 - 20 mg/dL Final    CREATININE 02/19/2021 1.18* 0.50 - 0.90 mg/dL Final    Bun/Cre Ratio 02/19/2021 19  9 - 20 Final    Calcium 02/19/2021 9.9  8.6 - 10.4 mg/dL Final    Sodium 02/19/2021 139  135 - 144 mmol/L Final    Potassium 02/19/2021 4.1  3.7 - 5.3 mmol/L Final    Chloride 02/19/2021 98  98 - 107 mmol/L Final    CO2 02/19/2021 29  20 - 31 mmol/L Final    Anion Gap 02/19/2021 12  9 - 17 mmol/L Final    Alkaline Phosphatase 02/19/2021 93  35 - 104 U/L Final    ALT 02/19/2021 17  5 - 33 U/L Final    AST 02/19/2021 18  <32 U/L Final    Total Bilirubin 02/19/2021 0.66  0.3 - 1.2 mg/dL Final    Total Protein 02/19/2021 7.6  6.4 - 8.3 g/dL Final    Albumin 02/19/2021 4.0  3.5 - 5.2 g/dL Final    Albumin/Globulin Ratio 02/19/2021 1.1  1.0 - 2.5 Final    GFR Non- 02/19/2021 47* >60 mL/min Final    GFR  02/19/2021 57* >60 mL/min Final    GFR Comment 02/19/2021        Final    Comment: Average GFR for 52-63 years old:   80 mL/min/1.73sq m  Chronic Kidney Disease:   <60 mL/min/1.73sq m  Kidney failure:   <15 mL/min/1.73sq m              eGFR calculated using average adult body mass. Additional eGFR calculator available at:        [employer]            GFR Staging 02/19/2021 NOT REPORTED   Final    Thyroxine, Free 02/19/2021 1.32  0.93 - 1.70 ng/dL Final    TSH 02/19/2021 2.23  0.30 - 5.00 mIU/L Final         Assessment and Plan:    1. Essential hypertension  Her blood pressure is elevated.   (BP: (!) 138/98) She was advised to continue current medications. Hydrochlorothiazide and Lisinopril were refilled:   - hydroCHLOROthiazide (HYDRODIURIL) 50 MG tablet; Take 1 tablet by mouth every morning  Dispense: 90 tablet; Refill: 1  - lisinopril (PRINIVIL;ZESTRIL) 30 MG tablet; TAKE 1 TABLET BY MOUTH ONCE DAILY  Dispense: 90 tablet; Refill: 1    - Comprehensive Metabolic Panel; Future prior to her return visit in 6 months. She was given a prescription for a blood pressure monitor:  - UNABLE TO FIND; Blood pressure monitor. Check blood pressure twice a week. Dispense: 1 Device; Refill: 0    She was advised to check her blood pressure at home once or twice a day, and to call or send  the readings through My Chart in 1-2 weeks. Printed information regarding Elevated Blood Pressure was provided to the patient with her after visit summary. 2. Mixed hyperlipidemia  Her lipid profile was improved on her recent lab work. She is tolerating Zetia well; this was refilled:   - ezetimibe (ZETIA) 10 MG tablet; Take 1 tablet by mouth daily  Dispense: 90 tablet; Refill: 1    3. Acquired hypothyroidism  TSH and free T4 were within normal limits on her recent blood work. She was advised to continue with her current dose of Levothyroxine. Levothyroxine was refilled:   - levothyroxine (EUTHYROX) 88 MCG tablet; Take 1 tablet by mouth Daily  Dispense: 90 tablet; Refill: 3    4. IFG (impaired fasting glucose)  Her fasting glucose and HgbA1c are both elevated, but she does not yet meet criteria to diagnose diabetes. I recommended that she decrease her intake of processed carbohydrates, begin a regular exercise program, and attempt to lose weight.      - Hemoglobin A1C; Future prior to her return visit in 6 months. Printed information regarding Prediabetes was provided to the patient with her after visit summary.       5. Bilateral lower extremity edema She is doing well with her current dose of Hydrochlorothiazide; this was refilled:  - hydroCHLOROthiazide (HYDRODIURIL) 50 MG tablet; Take 1 tablet by mouth every morning  Dispense: 90 tablet; Refill: 1     6. Routine health maintenance  Health maintenance was reviewed with the patient. She has received an influenza vaccine this influenza season. Colonoscopy was recommended. She will not agree to schedule a colonoscopy, but she is interested in fecal DNA testing. She has a Cologaurd kit at home. Tdap was recommended and declined. All other health maintenance, including Shingrix, is up to date at this time. There is no indication for Pneumovax or Prevnar-13 at this time.      (Please note that portions of this note were completed with a voice-recognition program. Efforts were made to edit the dictation but occasionally words are mis-transcribed.)

## 2021-02-23 NOTE — PATIENT INSTRUCTIONS
Patient Education        Prediabetes: Care Instructions  Overview     Prediabetes is a warning sign that you're at risk for getting type 2 diabetes. It means that your blood sugar is higher than it should be. But it's not high enough to be diabetes. The food you eat naturally turns into sugar. Your body uses the sugar for energy. Normally, an organ called the pancreas makes insulin. And insulin allows the sugar in your blood to get into your body's cells. But sometimes the body can't use insulin the right way. So the sugar stays in your blood instead. This is called insulin resistance. The buildup of sugar in your blood means you have prediabetes. The good news is that you may be able to prevent or delay diabetes. Making small lifestyle changes, like getting active and changing your eating habits, may help you get your blood sugar back to normal. You can work with your doctor to make a treatment plan. Follow-up care is a key part of your treatment and safety. Be sure to make and go to all appointments, and call your doctor if you are having problems. It's also a good idea to know your test results and keep a list of the medicines you take. How can you care for yourself at home? · Watch your weight. A healthy weight helps your body use insulin properly. · Limit the amount of calories, sweets, and unhealthy fat you eat. Ask your doctor if you should see a dietitian. A registered dietitian can help you create meal plans that fit your lifestyle. · Get at least 30 minutes of exercise on most days of the week. Exercise helps control your blood sugar. It also helps you maintain a healthy weight. Walking is a good choice. You also may want to do other activities, such as running, swimming, cycling, or playing tennis or team sports. · Do not smoke. Smoking can make prediabetes worse. If you need help quitting, talk to your doctor about stop-smoking programs and medicines. These can increase your chances of quitting for good. · If your doctor prescribed medicines, take them exactly as prescribed. Call your doctor if you think you are having a problem with your medicine. You will get more details on the specific medicines your doctor prescribes. When should you call for help? Watch closely for changes in your health, and be sure to contact your doctor if:    · You have any symptoms of diabetes. These may include:  ? Being thirsty more often. ? Urinating more. ? Being hungrier. ? Losing weight. ? Being very tired. ? Having blurry vision.     · You have a wound that will not heal.     · You have an infection that will not go away.     · You have problems with your blood pressure.     · You want more information about diabetes and how you can keep from getting it. Where can you learn more? Go to https://OpVista.ReferMe. org and sign in to your [employer] ([employer]) account. Enter I222 in the [employer] box to learn more about \"Prediabetes: Care Instructions. \"     If you do not have an account, please click on the \"Sign Up Now\" link. Current as of: December 20, 2019               Content Version: 12.6  © 3551-8510 [employer]. Care instructions adapted under license by [facility] ([facility]). If you have questions about a medical condition or this instruction, always ask your healthcare professional. [place] [address] any warranty or liability for your use of this information. Patient Education        Elevated Blood Pressure: Care Instructions  Your Care Instructions    Blood pressure is a measure of how hard the blood pushes against the walls of your arteries. It's normal for blood pressure to go up and down throughout the day. But if it stays up over time, you have high blood pressure. Two numbers tell you your blood pressure. The first number is the systolic pressure. It shows how hard the blood pushes when your heart is pumping. The second number is the diastolic pressure. It shows how hard the blood pushes between heartbeats, when your heart is relaxed and filling with blood. An ideal blood pressure in adults is less than 120/80 (say \"120 over 80\"). High blood pressure is 140/90 or higher. You have high blood pressure if your top number is 140 or higher or your bottom number is 90 or higher, or both. The main test for high blood pressure is simple, fast, and painless. To diagnose high blood pressure, your doctor will test your blood pressure at different times. After testing your blood pressure, your doctor may ask you to test it again when you are home. If you are diagnosed with high blood pressure, you can work with your doctor to make a long-term plan to manage it. Follow-up care is a key part of your treatment and safety. Be sure to make and go to all appointments, and call your doctor if you are having problems. It's also a good idea to know your test results and keep a list of the medicines you take. How can you care for yourself at home? · Do not smoke. Smoking increases your risk for heart attack and stroke. If you need help quitting, talk to your doctor about stop-smoking programs and medicines. These can increase your chances of quitting for good. · Stay at a healthy weight. · Try to limit how much sodium you eat to less than 2,300 milligrams (mg) a day. Your doctor may ask you to try to eat less than 1,500 mg a day. · Be physically active. Get at least 30 minutes of exercise on most days of the week. Walking is a good choice. You also may want to do other activities, such as running, swimming, cycling, or playing tennis or team sports. · Avoid or limit alcohol. Talk to your doctor about whether you can drink any alcohol. · Eat plenty of fruits, vegetables, and low-fat dairy products. Eat less saturated and total fats. · Learn how to check your blood pressure at home. When should you call for help? Call your doctor now or seek immediate medical care if:    · Your blood pressure is much higher than normal (such as 180/110 or higher).     · You think high blood pressure is causing symptoms such as:  ¨ Severe headache. ¨ Blurry vision.    Watch closely for changes in your health, and be sure to contact your doctor if:    · You do not get better as expected. Where can you learn more? Go to https://Funky MovespeSustainable Life Mediaeb.StarForce Technologies. org and sign in to your [employer] account. Enter O308 in the [employer] box to learn more about \"Elevated Blood Pressure: Care Instructions. \"     If you do not have an account, please click on the \"Sign Up Now\" link. Current as of: May 10, 2017  Content Version: 11.6  © 6776-5558 [employer]. Care instructions adapted under license by [facility] ([facility]). If you have questions about a medical condition or this instruction, always ask your healthcare professional. [place] [address] any warranty or liability for your use of this information. Check your blood pressure once or twice a day. Call or send your readings in a My Chart message in 1-2 weeks.

## 2021-08-24 ENCOUNTER — PATIENT MESSAGE (OUTPATIENT)
Dept: FAMILY MEDICINE CLINIC | Age: 61
End: 2021-08-24

## 2021-08-24 NOTE — TELEPHONE ENCOUNTER
From: Eamon Sanchez  To: Mckenna Spangler MD  Sent: 8/24/2021 3:12 PM EDT  Subject: Non-Urgent Medical Question    I have an appointment with Dr Olesya Billings on August 31, do I need bloodwork before the appointment?

## 2021-08-24 NOTE — TELEPHONE ENCOUNTER
Bandar Stewart,     I did find some orders for lab work. A CMP and A1C are ordered. You can get them done at the lab any time before your appointment!   Let us know if you need anything else!  Elsie Martinez LPN

## 2021-08-26 ENCOUNTER — HOSPITAL ENCOUNTER (OUTPATIENT)
Dept: LAB | Age: 61
Discharge: HOME | End: 2021-08-26
Payer: COMMERCIAL

## 2021-08-26 DIAGNOSIS — I10 ESSENTIAL HYPERTENSION: ICD-10-CM

## 2021-08-26 DIAGNOSIS — R73.01 IFG (IMPAIRED FASTING GLUCOSE): ICD-10-CM

## 2021-08-26 LAB
ALBUMIN SERPL-MCNC: 3.7 G/DL (ref 3.5–5.2)
ALBUMIN/GLOBULIN RATIO: 1.1 (ref 1–2.5)
ALP BLD-CCNC: 89 U/L (ref 35–104)
ALT SERPL-CCNC: 14 U/L (ref 5–33)
ANION GAP SERPL CALCULATED.3IONS-SCNC: 10 MMOL/L (ref 9–17)
AST SERPL-CCNC: 13 U/L
BILIRUB SERPL-MCNC: 0.64 MG/DL (ref 0.3–1.2)
BUN BLDV-MCNC: 21 MG/DL (ref 6–20)
BUN/CREAT BLD: 18 (ref 9–20)
CALCIUM SERPL-MCNC: 10.1 MG/DL (ref 8.6–10.4)
CHLORIDE BLD-SCNC: 99 MMOL/L (ref 98–107)
CO2: 30 MMOL/L (ref 20–31)
CREAT SERPL-MCNC: 1.18 MG/DL (ref 0.5–0.9)
ESTIMATED AVERAGE GLUCOSE: 137 MG/DL
GFR AFRICAN AMERICAN: 57 ML/MIN
GFR NON-AFRICAN AMERICAN: 47 ML/MIN
GFR SERPL CREATININE-BSD FRML MDRD: ABNORMAL ML/MIN/{1.73_M2}
GFR SERPL CREATININE-BSD FRML MDRD: ABNORMAL ML/MIN/{1.73_M2}
GLUCOSE BLD-MCNC: 123 MG/DL (ref 70–99)
HBA1C MFR BLD: 6.4 % (ref 4–6)
POTASSIUM SERPL-SCNC: 4.5 MMOL/L (ref 3.7–5.3)
SODIUM BLD-SCNC: 139 MMOL/L (ref 135–144)
TOTAL PROTEIN: 7.1 G/DL (ref 6.4–8.3)

## 2021-08-26 PROCEDURE — 83036 HEMOGLOBIN GLYCOSYLATED A1C: CPT

## 2021-08-26 PROCEDURE — 36415 COLL VENOUS BLD VENIPUNCTURE: CPT

## 2021-08-26 PROCEDURE — 80053 COMPREHEN METABOLIC PANEL: CPT

## 2021-08-31 ENCOUNTER — OFFICE VISIT (OUTPATIENT)
Dept: FAMILY MEDICINE CLINIC | Age: 61
End: 2021-08-31
Payer: COMMERCIAL

## 2021-08-31 VITALS
HEART RATE: 84 BPM | SYSTOLIC BLOOD PRESSURE: 128 MMHG | DIASTOLIC BLOOD PRESSURE: 86 MMHG | HEIGHT: 64 IN | BODY MASS INDEX: 50.02 KG/M2 | WEIGHT: 293 LBS | RESPIRATION RATE: 16 BRPM

## 2021-08-31 DIAGNOSIS — E78.2 MIXED HYPERLIPIDEMIA: ICD-10-CM

## 2021-08-31 DIAGNOSIS — E55.9 VITAMIN D DEFICIENCY: ICD-10-CM

## 2021-08-31 DIAGNOSIS — E66.8 EXTREME OBESITY: ICD-10-CM

## 2021-08-31 DIAGNOSIS — I10 ESSENTIAL HYPERTENSION: ICD-10-CM

## 2021-08-31 DIAGNOSIS — Z00.00 ANNUAL VISIT FOR GENERAL ADULT MEDICAL EXAMINATION WITHOUT ABNORMAL FINDINGS: Primary | ICD-10-CM

## 2021-08-31 DIAGNOSIS — R60.0 BILATERAL LOWER EXTREMITY EDEMA: ICD-10-CM

## 2021-08-31 DIAGNOSIS — Z12.11 SCREENING FOR COLON CANCER: ICD-10-CM

## 2021-08-31 DIAGNOSIS — R73.01 IFG (IMPAIRED FASTING GLUCOSE): ICD-10-CM

## 2021-08-31 DIAGNOSIS — Z12.31 ENCOUNTER FOR SCREENING MAMMOGRAM FOR BREAST CANCER: ICD-10-CM

## 2021-08-31 DIAGNOSIS — E03.9 ACQUIRED HYPOTHYROIDISM: ICD-10-CM

## 2021-08-31 DIAGNOSIS — N18.30 CHRONIC RENAL IMPAIRMENT, STAGE 3 (MODERATE), UNSPECIFIED WHETHER STAGE 3A OR 3B CKD (HCC): ICD-10-CM

## 2021-08-31 PROCEDURE — 99214 OFFICE O/P EST MOD 30 MIN: CPT | Performed by: FAMILY MEDICINE

## 2021-08-31 RX ORDER — LISINOPRIL 30 MG/1
TABLET ORAL
Qty: 90 TABLET | Refills: 1 | Status: SHIPPED | OUTPATIENT
Start: 2021-08-31 | End: 2022-03-04 | Stop reason: SDUPTHER

## 2021-08-31 RX ORDER — ORAL SEMAGLUTIDE 3 MG/1
3 TABLET ORAL DAILY
Qty: 30 TABLET | Refills: 5 | Status: SHIPPED | OUTPATIENT
Start: 2021-08-31 | End: 2022-03-04

## 2021-08-31 RX ORDER — HYDROCHLOROTHIAZIDE 50 MG/1
50 TABLET ORAL EVERY MORNING
Qty: 90 TABLET | Refills: 1 | Status: SHIPPED | OUTPATIENT
Start: 2021-08-31 | End: 2022-03-04 | Stop reason: SDUPTHER

## 2021-08-31 RX ORDER — EZETIMIBE 10 MG/1
10 TABLET ORAL DAILY
Qty: 90 TABLET | Refills: 1 | Status: SHIPPED | OUTPATIENT
Start: 2021-08-31 | End: 2021-12-07 | Stop reason: SDUPTHER

## 2021-08-31 ASSESSMENT — PATIENT HEALTH QUESTIONNAIRE - PHQ9
SUM OF ALL RESPONSES TO PHQ QUESTIONS 1-9: 0
1. LITTLE INTEREST OR PLEASURE IN DOING THINGS: 0
2. FEELING DOWN, DEPRESSED OR HOPELESS: 0
SUM OF ALL RESPONSES TO PHQ QUESTIONS 1-9: 0
SUM OF ALL RESPONSES TO PHQ QUESTIONS 1-9: 0
SUM OF ALL RESPONSES TO PHQ9 QUESTIONS 1 & 2: 0

## 2021-08-31 NOTE — PROGRESS NOTES
[address], [address] [place] [address] Drive                        Telephone [phone]             Fax [phone]     Ruben Denise  1960  MRN:  [id number]  Date of visit:  8/31/2021    Subjective:    Ruben Denise is a 63 y.o. female who presents to [facility] today (8/31/2021) for follow up/evaluation of: Annual Exam, Hypertension, Hyperlipidemia, Hyperglycemia, and Skin Discoloration      Patient has discoloration of both lower legs that she would like checked. She started to wear compression sleeves 4 months ago. Chief Complaint   Patient presents with    Annual Exam    Hypertension    Hyperlipidemia    Hyperglycemia    Skin Discoloration        She has received the [employer] Covid-19 vaccine. She has the following problem list:  Patient Active Problem List   Diagnosis    Acquired hypothyroidism    Vitamin D deficiency    Extreme obesity    Mixed hyperlipidemia    IFG (impaired fasting glucose)    High risk medication use    Essential hypertension        Current medications are:  Outpatient Medications Marked as Taking for the 8/31/21 encounter (Office Visit) with Pastor Sewell MD   Medication Sig Dispense Refill    levothyroxine (EUTHYROX) 88 MCG tablet Take 1 tablet by mouth Daily 90 tablet 3    hydroCHLOROthiazide (HYDRODIURIL) 50 MG tablet Take 1 tablet by mouth every morning 90 tablet 1    ezetimibe (ZETIA) 10 MG tablet Take 1 tablet by mouth daily 90 tablet 1    lisinopril (PRINIVIL;ZESTRIL) 30 MG tablet TAKE 1 TABLET BY MOUTH ONCE DAILY 90 tablet 1    UNABLE TO FIND Blood pressure monitor. Check blood pressure twice a week. 1 Device 0    UNABLE TO FIND Blood pressure monitor. Check blood pressure twice a week.  1 Device 0    Blood Pressure KIT Check blood pressure daily prn. 1 kit 0    aspirin 81 MG chewable tablet Take 81 mg by mouth daily  vitamin D (CHOLECALCIFEROL) 1000 UNIT TABS tablet Take 1,000 Units by mouth daily.  Calcium Carbonate-Vitamin D (CALCIUM PLUS VITAMIN D PO) Take  by mouth daily.  Multiple Vitamins-Minerals (MULTIVITAMIN PO) Take  by mouth daily. She is allergic to atorvastatin, penicillins, and pravastatin. She  reports that she has never smoked. She has never used smokeless tobacco.      Objective:    Vitals:    08/31/21 0953   BP: 128/86   Site: Right Upper Arm   Position: Sitting   Cuff Size: Large Adult   Pulse: 84   Resp: 16   Weight: (!) 356 lb 9.6 oz (161.8 kg)   Height: 5' 3.5\" (1.613 m)     Body mass index is 62.18 kg/m². Extremely obese female, alert, cooperative and in no acute distress. Neck supple. No adenopathy. Thyroid symmetric, normal size. Chest:  Normal expansion. Clear to auscultation. No rales, rhonchi, or wheezing. Heart sounds are normal.  Regular rate and rhythm without murmur, gallop or rub. Lower extremities have 1+ edema. There is brawny discoloration of the lower legs bilaterally consistent with venous stasis. Results of labs done 8/26/2021 were reviewed with the patient:   Hospital Outpatient Visit on 08/26/2021   Component Date Value Ref Range Status    Hemoglobin A1C 08/26/2021 6.4* 4.0 - 6.0 % Final    Estimated Avg Glucose 08/26/2021 137  mg/dL Final    Comment: The ADA and AACC recommend providing the estimated average glucose result to permit better   patient understanding of their HBA1c result.       Glucose 08/26/2021 123* 70 - 99 mg/dL Final    BUN 08/26/2021 21* 6 - 20 mg/dL Final    CREATININE 08/26/2021 1.18* 0.50 - 0.90 mg/dL Final    Bun/Cre Ratio 08/26/2021 18  9 - 20 Final    Calcium 08/26/2021 10.1  8.6 - 10.4 mg/dL Final    Sodium 08/26/2021 139  135 - 144 mmol/L Final    Potassium 08/26/2021 4.5  3.7 - 5.3 mmol/L Final    Chloride 08/26/2021 99  98 - 107 mmol/L Final    CO2 08/26/2021 30  20 - 31 mmol/L Final    Anion Gap 08/26/2021 10  9 - 17 mmol/L Final    Alkaline Phosphatase 08/26/2021 89  35 - 104 U/L Final    ALT 08/26/2021 14  5 - 33 U/L Final    AST 08/26/2021 13  <32 U/L Final    Total Bilirubin 08/26/2021 0.64  0.3 - 1.2 mg/dL Final    Total Protein 08/26/2021 7.1  6.4 - 8.3 g/dL Final    Albumin 08/26/2021 3.7  3.5 - 5.2 g/dL Final    Albumin/Globulin Ratio 08/26/2021 1.1  1.0 - 2.5 Final    GFR Non- 08/26/2021 47* >60 mL/min Final    GFR  08/26/2021 57* >60 mL/min Final    GFR Comment 08/26/2021        Final    Comment: Average GFR for 52-63 years old:   80 mL/min/1.73sq m  Chronic Kidney Disease:   <60 mL/min/1.73sq m  Kidney failure:   <15 mL/min/1.73sq m              eGFR calculated using average adult body mass. Additional eGFR calculator available at:        [email or web address]            GFR Staging 08/26/2021 NOT REPORTED   Final         Assessment and Plan:    1. Annual visit for general adult medical examination without abnormal findings   Health maintenance was reviewed with the patient. She has received the [employer] Covid-19 vaccine. Annual influenza vaccine was recommended. Colonoscopy was recommended. She declined. Other methods of screening for colon cancer including FIT testing and fecal DNA testing were discussed. The patient declined any screening for colon cancer. Screening mammogram was recommended and ordered. Tdap was recommended and declined. All other health maintenance, including Shingrix, is up to date at this time. There is no indication for Pneumovax or Prevnar-13 at this time. 2. Essential hypertension  Her blood pressure is adequately-controlled today. (BP: 128/86)   She was advised to continue current medications. Hydrochlorothiazide and Lisinopril were refilled:   - hydroCHLOROthiazide (HYDRODIURIL) 50 MG tablet; Take 1 tablet by mouth every morning  Dispense: 90 tablet;  Refill: 1  - lisinopril (PRINIVIL;ZESTRIL) 30 MG tablet; TAKE 1 TABLET BY MOUTH ONCE DAILY  Dispense: 90 tablet; Refill: 1    - Comprehensive Metabolic Panel; Future was ordered to be done prior to her return visit in 6 months. 3. Mixed hyperlipidemia  Her LDL was improved on 2/19/2021:  Lab Results   Component Value Date    CHOL 201 02/19/2021    HDL 52 02/19/2021    LDLCHOLESTEROL 117 02/19/2021    TRIG 161 02/19/2021     She is tolerating Zetia well; this was refilled:   - ezetimibe (ZETIA) 10 MG tablet; Take 1 tablet by mouth daily  Dispense: 90 tablet; Refill: 1    - Lipid, Fasting; Future was ordered to be done prior to her return visit in 6 months. 4. IFG (impaired fasting glucose)  5. Extreme obesity  Her fasting glucose and HgbA1c are both elevated, but she does not yet meet criteria to diagnose diabetes. I recommended that she decrease her intake of processed carbohydrates, begin a regular exercise program, and attempt to lose weight. I discussed beginning medication to help with glucose control, and may also help with weight loss. After discussion of risks and benefits, Rybelsus 3 mg daily was prescribed:   - Semaglutide (RYBELSUS) 3 MG TABS; Take 3 mg by mouth daily  Dispense: 30 tablet; Refill: 5    - Hemoglobin A1C; Future was ordered to be done prior to her return visit in 6 months. 6. Acquired hypothyroidism  TSH and free T4 were within normal limits on 2/19/2021:  Lab Results   Component Value Date    TSH 2.23 02/19/2021    THYROXINE 1.32 02/19/2021      She was advised to continue with her current dose of Levothyroxine. Labs were ordered to be done prior to her return visit in 6 months:   - TSH; Future  - T4, Free; Future    7. Bilateral lower extremity edema  Hydrochlorothiazide was refilled:  - hydroCHLOROthiazide (HYDRODIURIL) 50 MG tablet; Take 1 tablet by mouth every morning  Dispense: 90 tablet;  Refill: 1    I discussed with the patient that the discoloration of the lower legs is consistent with venous stasis. 8.  Chronic renal impairment, stage 3 (moderate), unspecified whether stage 3a or 3b CKD (HCC)  The creatinine was stable (1.18 mg/dL) on her recent labs. 9. Vitamin D deficiency  Her 25-hydroxy Vitamin D level was within normal limits (39.9 ng/mL) on 6/6/2017. 25-hydroxy Vitamin D level was ordered to be done prior to her return visit in 6 months. 10. Screening for colon cancer  Colonoscopy was recommended. Printed information regarding Colon Cancer Screening was provided to the patient with her after visit summary.           (Please note that portions of this note were completed with a voice-recognition program. Efforts were made to edit the dictation but occasionally words are mis-transcribed.)

## 2021-08-31 NOTE — PATIENT INSTRUCTIONS
Patient Education        Colon Cancer Screening: Care Instructions  Overview     Colorectal cancer occurs in the colon or rectum. That's the lower part of your digestive system. It often starts in small growths called polyps in the colon or rectum. Polyps are usually found with screening tests. Depending on the type of test, any polyps found may be removed during the tests. Colorectal cancer usually does not cause symptoms at first. But regular tests can help find it early, before it spreads and becomes harder to treat. Your risk for colorectal cancer gets higher as you get older. Some experts say that adults should start regular screening at age 48 and stop at age 76. Others say to start before age 48 or continue after age 76. Talk with your doctor about your risk and when to start and stop screening. You may have one of several tests. Follow-up care is a key part of your treatment and safety. Be sure to make and go to all appointments, and call your doctor if you are having problems. It's also a good idea to know your test results and keep a list of the medicines you take. What are the main screening tests for colon cancer? The screening tests are:  Stool tests. These include the guaiac fecal occult blood test (gFOBT), the fecal immunochemical test (FIT), and the combined fecal immunochemical test and stool DNA test (FIT-DNA). These tests check stool samples for signs of cancer. If your test is positive, you will need to have a colonoscopy. Sigmoidoscopy. This test lets your doctor look at the lining of your rectum and the lowest part of your colon. Your doctor uses a lighted tube called a sigmoidoscope. This test can't find cancers or polyps in the upper part of your colon. In some cases, polyps that are found can be removed. But if your doctor finds polyps, you will need to have a colonoscopy to check the upper part of your colon. Colonoscopy.    This test lets your doctor look at the lining of your rectum and your entire colon. The doctor uses a thin, flexible tool called a colonoscope. It can also be used to remove polyps or get a tissue sample (biopsy). A less common test is CT colonography (CTC). It's also called virtual colonoscopy. Who should be screened for colorectal cancer? Your risk for colorectal cancer gets higher as you get older. Some experts say that adults should start regular screening at age 48 and stop at age 76. Others say to start before age 48 or continue after age 76. Talk with your doctor about your risk and when to start and stop screening. How often you need screening depends on the type of test you get:  Stool tests. Every 1 or 2 years for FIT or gFOBT. Every 3 years for sDNA, also called FIT-DNA. Tests that look inside the colon. Every 5 or 10 years for sigmoidoscopy. Every 5 years for CT colonography (virtual colonoscopy). Every 10 years for colonoscopy. Experts agree that people at higher risk may need to be tested sooner and more often. This includes people who have a strong family history of colon cancer. Talk to your doctor about which test is best for you and when to be tested. When should you call for help? Watch closely for changes in your health, and be sure to contact your doctor if:    · You have any changes in your bowel habits.     · You have any problems. Where can you learn more? Go to https://[email or web address]. org and sign in to your [employer] account. Enter [phone] in the [facility] box to learn more about \"Colon Cancer Screening: Care Instructions. \"     If you do not have an account, please click on the \"Sign Up Now\" link. Current as of: December 17, 2020               Content Version: 12.9  © 1865-1844 Healthwise, [employer]. Care instructions adapted under license by [facility] ([facility]).  If you have questions about a medical condition or this instruction, always ask your healthcare professional. Thierry Paez disclaims any warranty or liability for your use of this information. Remember to get a flu vaccine in the fall! The flu vaccine typically becomes available in September or October.

## 2021-09-10 ENCOUNTER — HOSPITAL ENCOUNTER (OUTPATIENT)
Dept: MAMMOGRAPHY | Age: 61
Discharge: HOME | End: 2021-09-12
Payer: COMMERCIAL

## 2021-09-10 DIAGNOSIS — Z12.31 ENCOUNTER FOR SCREENING MAMMOGRAM FOR BREAST CANCER: ICD-10-CM

## 2021-09-10 PROCEDURE — 77063 BREAST TOMOSYNTHESIS BI: CPT

## 2021-09-10 PROCEDURE — 77067 SCR MAMMO BI INCL CAD: CPT

## 2021-12-06 ENCOUNTER — PATIENT MESSAGE (OUTPATIENT)
Dept: FAMILY MEDICINE CLINIC | Age: 61
End: 2021-12-06

## 2021-12-06 DIAGNOSIS — E78.2 MIXED HYPERLIPIDEMIA: ICD-10-CM

## 2021-12-06 NOTE — TELEPHONE ENCOUNTER
From: Susanna Gutierres  To: Dr. Carito Marvin: 12/6/2021 2:05 PM EST  Subject: Prescription Question    Refilled my Ezetimibe 10mg prescription but can not find it. Think it may have gotten thrown into the trash. Have gone through the trash and couldnt find it. What can I do?

## 2021-12-07 RX ORDER — EZETIMIBE 10 MG/1
10 TABLET ORAL DAILY
Qty: 90 TABLET | Refills: 1 | Status: SHIPPED | OUTPATIENT
Start: 2021-12-07 | End: 2021-12-28 | Stop reason: SDUPTHER

## 2021-12-28 DIAGNOSIS — E78.2 MIXED HYPERLIPIDEMIA: ICD-10-CM

## 2021-12-29 RX ORDER — EZETIMIBE 10 MG/1
10 TABLET ORAL DAILY
Qty: 90 TABLET | Refills: 1 | Status: SHIPPED | OUTPATIENT
Start: 2021-12-29 | End: 2022-03-04 | Stop reason: SDUPTHER

## 2021-12-29 NOTE — TELEPHONE ENCOUNTER
Script sent to wrong pharmacy. Cancelled old script. New script pended to right pharmacy.       [facility] called requesting a refill of the below medication which has been pended for you:     Requested Prescriptions     Pending Prescriptions Disp Refills    ezetimibe (ZETIA) 10 MG tablet 90 tablet 1     Sig: Take 1 tablet by mouth daily       Last Appointment Date: 8/31/2021  Next Appointment Date: 3/4/2022    Allergies   Allergen Reactions    Atorvastatin      back pain    Penicillins Hives    Pravastatin Other (See Comments)     Made her back and legs hurt,affected her mood

## 2022-02-28 ENCOUNTER — HOSPITAL ENCOUNTER (OUTPATIENT)
Dept: LAB | Age: 62
Discharge: HOME | End: 2022-02-28
Payer: COMMERCIAL

## 2022-02-28 DIAGNOSIS — R73.01 IFG (IMPAIRED FASTING GLUCOSE): ICD-10-CM

## 2022-02-28 DIAGNOSIS — E03.9 ACQUIRED HYPOTHYROIDISM: ICD-10-CM

## 2022-02-28 DIAGNOSIS — E55.9 VITAMIN D DEFICIENCY: ICD-10-CM

## 2022-02-28 DIAGNOSIS — E66.8 EXTREME OBESITY: ICD-10-CM

## 2022-02-28 DIAGNOSIS — I10 ESSENTIAL HYPERTENSION: ICD-10-CM

## 2022-02-28 DIAGNOSIS — E78.2 MIXED HYPERLIPIDEMIA: ICD-10-CM

## 2022-02-28 LAB
ALBUMIN SERPL-MCNC: 3.9 G/DL (ref 3.5–5.2)
ALBUMIN/GLOBULIN RATIO: 1.1 (ref 1–2.5)
ALP BLD-CCNC: 94 U/L (ref 35–104)
ALT SERPL-CCNC: 19 U/L (ref 5–33)
ANION GAP SERPL CALCULATED.3IONS-SCNC: 9 MMOL/L (ref 9–17)
AST SERPL-CCNC: 16 U/L
BILIRUB SERPL-MCNC: 0.66 MG/DL (ref 0.3–1.2)
BUN BLDV-MCNC: 21 MG/DL (ref 6–20)
BUN/CREAT BLD: 18 (ref 9–20)
CALCIUM SERPL-MCNC: 9.9 MG/DL (ref 8.6–10.4)
CHLORIDE BLD-SCNC: 97 MMOL/L (ref 98–107)
CHOLESTEROL, FASTING: 181 MG/DL
CHOLESTEROL/HDL RATIO: 4.1
CO2: 30 MMOL/L (ref 20–31)
CREAT SERPL-MCNC: 1.17 MG/DL (ref 0.5–0.9)
ESTIMATED AVERAGE GLUCOSE: 151 MG/DL
GFR AFRICAN AMERICAN: 57 ML/MIN
GFR NON-AFRICAN AMERICAN: 47 ML/MIN
GFR SERPL CREATININE-BSD FRML MDRD: ABNORMAL ML/MIN/{1.73_M2}
GLUCOSE BLD-MCNC: 121 MG/DL (ref 70–99)
HBA1C MFR BLD: 6.9 % (ref 4–6)
HDLC SERPL-MCNC: 44 MG/DL
LDL CHOLESTEROL: 101 MG/DL (ref 0–130)
POTASSIUM SERPL-SCNC: 4.3 MMOL/L (ref 3.7–5.3)
SODIUM BLD-SCNC: 136 MMOL/L (ref 135–144)
THYROXINE, FREE: 1.35 NG/DL (ref 0.93–1.7)
TOTAL PROTEIN: 7.4 G/DL (ref 6.4–8.3)
TRIGLYCERIDE, FASTING: 181 MG/DL
TSH SERPL DL<=0.05 MIU/L-ACNC: 2.55 MIU/L (ref 0.3–5)
VITAMIN D 25-HYDROXY: 47 NG/ML (ref 30–100)

## 2022-02-28 PROCEDURE — 84443 ASSAY THYROID STIM HORMONE: CPT

## 2022-02-28 PROCEDURE — 82306 VITAMIN D 25 HYDROXY: CPT

## 2022-02-28 PROCEDURE — 84439 ASSAY OF FREE THYROXINE: CPT

## 2022-02-28 PROCEDURE — 80053 COMPREHEN METABOLIC PANEL: CPT

## 2022-02-28 PROCEDURE — 83036 HEMOGLOBIN GLYCOSYLATED A1C: CPT

## 2022-02-28 PROCEDURE — 36415 COLL VENOUS BLD VENIPUNCTURE: CPT

## 2022-02-28 PROCEDURE — 80061 LIPID PANEL: CPT

## 2022-03-04 ENCOUNTER — OFFICE VISIT (OUTPATIENT)
Dept: FAMILY MEDICINE CLINIC | Age: 62
End: 2022-03-04
Payer: COMMERCIAL

## 2022-03-04 VITALS
WEIGHT: 293 LBS | HEART RATE: 88 BPM | BODY MASS INDEX: 50.02 KG/M2 | SYSTOLIC BLOOD PRESSURE: 130 MMHG | DIASTOLIC BLOOD PRESSURE: 84 MMHG | TEMPERATURE: 97.9 F | OXYGEN SATURATION: 98 % | HEIGHT: 64 IN

## 2022-03-04 DIAGNOSIS — Z12.31 BREAST CANCER SCREENING BY MAMMOGRAM: ICD-10-CM

## 2022-03-04 DIAGNOSIS — R60.0 BILATERAL LOWER EXTREMITY EDEMA: ICD-10-CM

## 2022-03-04 DIAGNOSIS — E03.9 ACQUIRED HYPOTHYROIDISM: ICD-10-CM

## 2022-03-04 DIAGNOSIS — E11.9 TYPE 2 DIABETES MELLITUS WITHOUT COMPLICATION, WITHOUT LONG-TERM CURRENT USE OF INSULIN (HCC): ICD-10-CM

## 2022-03-04 DIAGNOSIS — E55.9 VITAMIN D DEFICIENCY: ICD-10-CM

## 2022-03-04 DIAGNOSIS — I10 ESSENTIAL HYPERTENSION: ICD-10-CM

## 2022-03-04 DIAGNOSIS — E78.2 MIXED HYPERLIPIDEMIA: Primary | ICD-10-CM

## 2022-03-04 PROCEDURE — 99214 OFFICE O/P EST MOD 30 MIN: CPT | Performed by: FAMILY MEDICINE

## 2022-03-04 RX ORDER — LEVOTHYROXINE SODIUM 88 UG/1
88 TABLET ORAL DAILY
Qty: 90 TABLET | Refills: 3 | Status: SHIPPED | OUTPATIENT
Start: 2022-03-04 | End: 2022-09-07 | Stop reason: SDUPTHER

## 2022-03-04 RX ORDER — HYDROCHLOROTHIAZIDE 50 MG/1
50 TABLET ORAL EVERY MORNING
Qty: 90 TABLET | Refills: 1 | Status: SHIPPED | OUTPATIENT
Start: 2022-03-04 | End: 2022-08-30

## 2022-03-04 RX ORDER — EZETIMIBE 10 MG/1
10 TABLET ORAL DAILY
Qty: 90 TABLET | Refills: 1 | Status: SHIPPED | OUTPATIENT
Start: 2022-03-04

## 2022-03-04 RX ORDER — LISINOPRIL 30 MG/1
TABLET ORAL
Qty: 90 TABLET | Refills: 1 | Status: SHIPPED | OUTPATIENT
Start: 2022-03-04 | End: 2022-08-30

## 2022-03-04 RX ORDER — ORAL SEMAGLUTIDE 3 MG/1
3 TABLET ORAL DAILY
Qty: 30 TABLET | Refills: 3 | Status: SHIPPED | OUTPATIENT
Start: 2022-03-04 | End: 2022-03-22 | Stop reason: DRUGHIGH

## 2022-03-04 SDOH — ECONOMIC STABILITY: FOOD INSECURITY: WITHIN THE PAST 12 MONTHS, YOU WORRIED THAT YOUR FOOD WOULD RUN OUT BEFORE YOU GOT MONEY TO BUY MORE.: NEVER TRUE

## 2022-03-04 SDOH — ECONOMIC STABILITY: FOOD INSECURITY: WITHIN THE PAST 12 MONTHS, THE FOOD YOU BOUGHT JUST DIDN'T LAST AND YOU DIDN'T HAVE MONEY TO GET MORE.: NEVER TRUE

## 2022-03-04 ASSESSMENT — SOCIAL DETERMINANTS OF HEALTH (SDOH): HOW HARD IS IT FOR YOU TO PAY FOR THE VERY BASICS LIKE FOOD, HOUSING, MEDICAL CARE, AND HEATING?: NOT HARD AT ALL

## 2022-03-04 NOTE — PROGRESS NOTES
[facility] [address], [address] [place] [address] Drive                        Telephone [phone]             Fax [phone]       Hayley Shanks  :  1960  Age:  63 y.o. MRN:  [id number]  Date of visit:  3/4/2022       Assessment and Plan:    1. Type 2 diabetes mellitus without complication, without long-term current use of insulin (HCC)  Her HgbA1c was 6.9 %. I discussed with the patient that she now meets criteria to diagnose diabetes. She was referred for diabetes education:  - Ambulatory referral to Diabetic Education    I discussed with the patient that weight loss should help glucose control. I recommended beginning Rybelsus. After discussion of risks, benefits, and side effects, Rybelsus was prescribed:  - Semaglutide (RYBELSUS) 3 MG TABS; Take 3 mg by mouth daily  Dispense: 30 tablet; Refill: 3    - Hemoglobin A1C; Future was ordered to be done prior to her return visit in 3 months. 2. Essential hypertension  Her blood pressure is adequately-controlled today. (BP: 130/84)   She was advised to continue current medications. Lisinopril and Hydrochlorothiazide were refilled:   - lisinopril (PRINIVIL;ZESTRIL) 30 MG tablet; TAKE 1 TABLET BY MOUTH ONCE DAILY  Dispense: 90 tablet; Refill: 1  - hydroCHLOROthiazide (HYDRODIURIL) 50 MG tablet; Take 1 tablet by mouth every morning  Dispense: 90 tablet; Refill: 1    3. Mixed hyperlipidemia  Her lipid profile was not at goal on her recent lab work. She has been intolerant of statins previously. She is tolerating Zetia well; this was refilled:   - ezetimibe (ZETIA) 10 MG tablet; Take 1 tablet by mouth daily  Dispense: 90 tablet; Refill: 1    Labs were ordered to be done prior to her return visit in 3 months:   - Comprehensive Metabolic Panel; Future  - Lipid Panel; Future    4. Acquired hypothyroidism  TSH and free T4 were within normal limits on her recent blood work.   Lab Results   Component Value Date    TSH 2.55 02/28/2022    THYROXINE 1.35 02/28/2022      She was advised to continue with her current dose of Levothyroxine. Levothyroxine was refilled:   - levothyroxine (EUTHYROX) 88 MCG tablet; Take 1 tablet by mouth Daily  Dispense: 90 tablet; Refill: 3    5. Vitamin D deficiency  Her 25-hydroxy Vitamin D level was within normal limits (47.0 ng/mL) on  her recent lab work. She was advised to continue with her current dose of Vitamin D. Vitamin D was refilled:     6. Bilateral lower extremity edema  She is doing well with her current dose of Hydrochlorothiazide; this was refilled:  - hydroCHLOROthiazide (HYDRODIURIL) 50 MG tablet; Take 1 tablet by mouth every morning  Dispense: 90 tablet; Refill: 1    7. Routine health maintenance  Health maintenance was reviewed with the patient. She has received one dose of the [employer] Covid-19 vaccine, and one dose of the Moderna Covid-19 vaccine. She was advised that an additional dose of the Covid-19 vaccine may be recommended. She has received an influenza vaccine this influenza season. Screening mammogram was recommended and ordered. Colonoscopy was recommended. She declined. Tdap was recommended and declined. With her new diagnosis of diabetes, pneumonia vaccination is recommended. I inadvertently did not discuss this with her at her office visit today. I will discuss this with her at her next office visit. All other health maintenance, including Shingrix, is up to date at this time. Follow up instructions were given to the patient:  Return in about 3 months (around 6/4/2022) for diabetes.          Subjective:    Hayley Shanks is a 63 y.o. female who presents to [place] [address] today (3/4/2022) for follow up/evaluation of:  6 Month Follow-Up, Hyperlipidemia, Hypertension, Hypothyroidism, Other (Vitamin D deficiency, Elevated fasting glucose), and Other (Patient has not started Rybelsus. Would like to discuss if she would like to restart or not.)      She states that she has been feeling well. She reports that she did not fill the prescription for Rybelsus, as she wasn't certain if it was necessary. She is tolerating her medications well. She has no new concerns or complaints today. She has received one dose of the [employer] Covid-19 vaccine, and one dose of the Moderna Covid-19 vaccine.        Immunization history was reviewed:  Immunization History   Administered Date(s) Administered    COVID-19, J&J, PF, 0.5 mL 03/24/2021    Influenza Vaccine, unspecified formulation 10/14/2010, 10/08/2013, 10/01/2017    Influenza Virus Vaccine 01/01/2012, 10/10/2013, 10/05/2016, 10/01/2017, 10/17/2019, 10/25/2021    Influenza Whole 10/14/2008, 10/13/2009, 01/01/2012, 10/16/2012    Influenza, MDCK Quadv, IM, PF (Flucelvax 2 yrs and older) 10/19/2020    Influenza, Rosalio Lard, 6 mo and older, IM, PF (Flulaval, Fluarix) 10/16/2018    Influenza, Quadv, IM, PF (6 mo and older Fluzone, Flulaval, Fluarix, and 3 yrs and older Afluria) 10/27/2015, 10/05/2016, 10/16/2018, 10/17/2019, 10/25/2021    Zoster Recombinant (Shingrix) 05/12/2020, 07/31/2020          She has the following problem list:  Patient Active Problem List   Diagnosis    Acquired hypothyroidism    Vitamin D deficiency    Extreme obesity    Mixed hyperlipidemia    IFG (impaired fasting glucose)    High risk medication use    Essential hypertension        Current medications are:  Outpatient Medications Marked as Taking for the 3/4/22 encounter (Office Visit) with Brayan Hoffman MD   Medication Sig Dispense Refill    ezetimibe (ZETIA) 10 MG tablet Take 1 tablet by mouth daily 90 tablet 1    hydroCHLOROthiazide (HYDRODIURIL) 50 MG tablet Take 1 tablet by mouth every morning 90 tablet 1    lisinopril (PRINIVIL;ZESTRIL) 30 MG tablet TAKE 1 TABLET BY MOUTH ONCE DAILY 90 tablet 1    levothyroxine (EUTHYROX) 88 MCG tablet Take 1 tablet by mouth Daily 90 tablet 3    UNABLE TO FIND Blood pressure monitor. Check blood pressure twice a week. 1 Device 0    UNABLE TO FIND Blood pressure monitor. Check blood pressure twice a week. 1 Device 0    Blood Pressure KIT Check blood pressure daily prn. 1 kit 0    aspirin 81 MG chewable tablet Take 81 mg by mouth daily      vitamin D (CHOLECALCIFEROL) 1000 UNIT TABS tablet Take 1,000 Units by mouth daily.  Calcium Carbonate-Vitamin D (CALCIUM PLUS VITAMIN D PO) Take  by mouth daily.  Multiple Vitamins-Minerals (MULTIVITAMIN PO) Take  by mouth daily. She is allergic to atorvastatin, penicillins, and pravastatin. She  reports that she has never smoked. She has never used smokeless tobacco.      Objective:    Vitals:    03/04/22 1102   BP: 130/84   Site: Right Upper Arm   Position: Sitting   Cuff Size: Large Adult   Pulse: 88   Temp: 97.9 °F (36.6 °C)   TempSrc: Temporal   SpO2: 98%   Weight: (!) 351 lb 6.4 oz (159.4 kg)   Height: 5' 3.5\" (1.613 m)     Body mass index is 61.27 kg/m². Extremely obese female, healthy-appearing, alert, cooperative and in no acute distress. Neck supple. No adenopathy. Thyroid symmetric, normal size. Chest:  Normal expansion. Clear to auscultation. No rales, rhonchi, or wheezing. Heart sounds are normal.  Regular rate and rhythm without murmur, gallop or rub. Lower extremities have 1+ edema.     Results of labs done 2/28/2022 were reviewed with the patient:   Hospital Outpatient Visit on 02/28/2022   Component Date Value Ref Range Status    Cholesterol, Fasting 02/28/2022 181  <200 mg/dL Final    Comment:    Cholesterol Guidelines:      <200  Desirable   200-240  Borderline      >240  Undesirable         HDL 02/28/2022 44  >40 mg/dL Final    Comment:    HDL Guidelines:    <40     Undesirable   40-59    Borderline    >59     Desirable         LDL Cholesterol 02/28/2022 101  0 - 130 mg/dL Final    Comment:    LDL Guidelines:     <100 Desirable   100-129   Near to/above Desirable   130-159   Borderline      >159   Undesirable     Direct (measured) LDL and calculated LDL are not interchangeable tests.  Chol/HDL Ratio 02/28/2022 4.1  <5 Final            Triglyceride, Fasting 02/28/2022 181* <150 mg/dL Final    Comment:    Triglyceride Guidelines:     <150   Desirable   150-199  Borderline   200-499  High     >499   Very high   Based on AHA Guidelines for fasting triglyceride, October 2012.  Hemoglobin A1C 02/28/2022 6.9* 4.0 - 6.0 % Final    Estimated Avg Glucose 02/28/2022 151  mg/dL Final    Comment: The ADA and AACC recommend providing the estimated average glucose result to permit better   patient understanding of their HBA1c result.  Glucose 02/28/2022 121* 70 - 99 mg/dL Final    BUN 02/28/2022 21* 6 - 20 mg/dL Final    CREATININE 02/28/2022 1.17* 0.50 - 0.90 mg/dL Final    Bun/Cre Ratio 02/28/2022 18  9 - 20 Final    Calcium 02/28/2022 9.9  8.6 - 10.4 mg/dL Final    Sodium 02/28/2022 136  135 - 144 mmol/L Final    Potassium 02/28/2022 4.3  3.7 - 5.3 mmol/L Final    Chloride 02/28/2022 97* 98 - 107 mmol/L Final    CO2 02/28/2022 30  20 - 31 mmol/L Final    Anion Gap 02/28/2022 9  9 - 17 mmol/L Final    Alkaline Phosphatase 02/28/2022 94  35 - 104 U/L Final    ALT 02/28/2022 19  5 - 33 U/L Final    AST 02/28/2022 16  <32 U/L Final    Total Bilirubin 02/28/2022 0.66  0.3 - 1.2 mg/dL Final    Total Protein 02/28/2022 7.4  6.4 - 8.3 g/dL Final    Albumin 02/28/2022 3.9  3.5 - 5.2 g/dL Final    Albumin/Globulin Ratio 02/28/2022 1.1  1.0 - 2.5 Final    GFR Non- 02/28/2022 47* >60 mL/min Final    GFR  02/28/2022 57* >60 mL/min Final    GFR Comment 02/28/2022        Final    Comment: Average GFR for 52-63 years old:   80 mL/min/1.73sq m  Chronic Kidney Disease:   <60 mL/min/1.73sq m  Kidney failure:   <15 mL/min/1.73sq m              eGFR calculated using average adult body mass. Additional eGFR calculator available at:        tastytrade.br            Thyroxine, Free 02/28/2022 1.35  0.93 - 1.70 ng/dL Final    TSH 02/28/2022 2.55  0.30 - 5.00 mIU/L Final    Vit D, 25-Hydroxy 02/28/2022 47.0  30.0 - 100.0 ng/mL Final    Comment:    Reference Range:  Vitamin D status         Range   Deficiency              <20 ng/mL   Mild Deficiency       20-30 ng/mL   Sufficiency           ng/mL   Toxicity               >100 ng/mL               (Please note that portions of this note were completed with a voice-recognition program. Efforts were made to edit the dictation but occasionally words are mis-transcribed.)

## 2022-03-22 ENCOUNTER — OFFICE VISIT (OUTPATIENT)
Dept: DIABETES SERVICES | Age: 62
End: 2022-03-22
Payer: COMMERCIAL

## 2022-03-22 VITALS
BODY MASS INDEX: 51.91 KG/M2 | DIASTOLIC BLOOD PRESSURE: 88 MMHG | HEIGHT: 63 IN | RESPIRATION RATE: 14 BRPM | SYSTOLIC BLOOD PRESSURE: 110 MMHG | WEIGHT: 293 LBS | HEART RATE: 88 BPM

## 2022-03-22 DIAGNOSIS — E78.2 MIXED HYPERLIPIDEMIA: ICD-10-CM

## 2022-03-22 DIAGNOSIS — E11.9 TYPE 2 DIABETES MELLITUS WITHOUT COMPLICATION, WITHOUT LONG-TERM CURRENT USE OF INSULIN (HCC): Primary | ICD-10-CM

## 2022-03-22 DIAGNOSIS — N18.31 STAGE 3A CHRONIC KIDNEY DISEASE (HCC): ICD-10-CM

## 2022-03-22 DIAGNOSIS — I10 ESSENTIAL HYPERTENSION: ICD-10-CM

## 2022-03-22 PROCEDURE — 99214 OFFICE O/P EST MOD 30 MIN: CPT | Performed by: NURSE PRACTITIONER

## 2022-03-22 PROCEDURE — 99205 OFFICE O/P NEW HI 60 MIN: CPT | Performed by: NURSE PRACTITIONER

## 2022-03-22 PROCEDURE — 3044F HG A1C LEVEL LT 7.0%: CPT | Performed by: NURSE PRACTITIONER

## 2022-03-22 RX ORDER — ORAL SEMAGLUTIDE 7 MG/1
7 TABLET ORAL DAILY
Qty: 30 TABLET | Refills: 3 | Status: SHIPPED | OUTPATIENT
Start: 2022-03-22 | End: 2022-06-07 | Stop reason: DRUGHIGH

## 2022-03-22 ASSESSMENT — ENCOUNTER SYMPTOMS
DIARRHEA: 0
ABDOMINAL PAIN: 0
RESPIRATORY NEGATIVE: 1
SHORTNESS OF BREATH: 0

## 2022-03-22 NOTE — PROGRESS NOTES
[facility] [address]  [address] [facility], [address]  [facility] [address] [phone]        HISTORY:    Meggan Dyer presents today for evaluation and management of:  Chief Complaint   Patient presents with   Or[place]en Pippa Patient     Dr. Lora Cote referral. Type 2 diabetes. Diagnosed at 3/4/2022 appt with Dr. Lora Cote. Has eye appt coming up and will inform of new diagnosis. HPI    Interval History:    Past DM Medications   none    Current Diabetic Medications  rybelsus 3 mg    DKA episodes: 0      03/22/22   Meggan Dyer is a 63 y.o. female patient who presents to clinic today for her diabetes. she has a history of HTN, hyperlipidemia and obesity which contributes to her diabetes. She was recently diagnosed and started on rybelsus. she denies any current signs or symptoms of hyper/hypoglycemia. she states they are taking their medications as prescribed without any adverse effects. Diet: weight watches  Exercise: none, has a treadmill  BS testing: none  Issues: denies   Diabetic foot exam up-to-date: No  Diabetic retinal exam up-to-date: No  Hypoglycemia as needed treatment: snack    High cholesterol-  Takes zetia and denies any adverse effects with its use. Watches diet and exercise. Hypertension-  Takes HCTZ, lisinopril and denies any adverse effects with their use. Watches diet and exercise. Denies any chest pain, dizziness or edema. Obesity- Working on weight loss.        Past Medical History:   Diagnosis Date    Dyslipidemia     Elevated blood pressure     Extreme obesity     Hypothyroidism     IFG (impaired fasting glucose) 8/11/2016    Lower extremity edema     Vitamin D deficiency      Family History   Problem Relation Age of Onset    High Cholesterol Father     High Blood Pressure Father     Heart Disease Father     Coronary Art Dis Paternal Grandmother     High Blood Pressure Brother     High Cholesterol Brother  Diabetes Brother     Breast Cancer Maternal Aunt      Social History     Tobacco Use    Smoking status: Never Smoker    Smokeless tobacco: Never Used   Substance Use Topics    Alcohol use: Yes     Comment: occasional    Drug use: No     Allergies   Allergen Reactions    Atorvastatin      back pain    Penicillins Hives    Pravastatin Other (See Comments)     Made her back and legs hurt,affected her mood       MEDICATIONS:  Current Outpatient Medications   Medication Sig Dispense Refill    Semaglutide (RYBELSUS) 7 MG TABS Take 7 mg by mouth daily 30 tablet 3    ezetimibe (ZETIA) 10 MG tablet Take 1 tablet by mouth daily 90 tablet 1    hydroCHLOROthiazide (HYDRODIURIL) 50 MG tablet Take 1 tablet by mouth every morning 90 tablet 1    lisinopril (PRINIVIL;ZESTRIL) 30 MG tablet TAKE 1 TABLET BY MOUTH ONCE DAILY 90 tablet 1    levothyroxine (EUTHYROX) 88 MCG tablet Take 1 tablet by mouth Daily 90 tablet 3    aspirin 81 MG chewable tablet Take 81 mg by mouth daily      vitamin D (CHOLECALCIFEROL) 1000 UNIT TABS tablet Take 1,000 Units by mouth daily.  Calcium Carbonate-Vitamin D (CALCIUM PLUS VITAMIN D PO) Take  by mouth daily.  Multiple Vitamins-Minerals (MULTIVITAMIN PO) Take  by mouth daily.  UNABLE TO FIND Blood pressure monitor. Check blood pressure twice a week. 1 Device 0    UNABLE TO FIND Blood pressure monitor. Check blood pressure twice a week. 1 Device 0    Blood Pressure KIT Check blood pressure daily prn. 1 kit 0     No current facility-administered medications for this visit. Review ofSymptoms:  Review of Systems   Constitutional: Positive for fatigue. Negative for unexpected weight change. Eyes: Negative for visual disturbance. Respiratory: Negative. Negative for shortness of breath. Cardiovascular: Negative for chest pain and leg swelling. Gastrointestinal: Negative for abdominal pain and diarrhea.    Endocrine: Negative for polydipsia, polyphagia and polyuria. Genitourinary: Negative. Musculoskeletal: Negative. Skin: Negative for rash and wound. Neurological: Negative for dizziness, tremors, seizures and headaches. Psychiatric/Behavioral: Negative. Negative for confusion and decreased concentration. Theremainder of a complete 14-point review of systems is negative. Vital Signs: /88 (Site: Right Upper Arm, Position: Sitting, Cuff Size: Large Adult)   Pulse 88   Resp 14   Ht 5' 3\" (1.6 m)   Wt (!) 349 lb (158.3 kg)   LMP 01/01/2011   BMI 61.82 kg/m²      Wt Readings from Last 3 Encounters:   03/22/22 (!) 349 lb (158.3 kg)   03/04/22 (!) 351 lb 6.4 oz (159.4 kg)   08/31/21 (!) 356 lb 9.6 oz (161.8 kg)     Body mass index is 61.82 kg/m².   LABS:  Hemoglobin A1C   Date Value Ref Range Status   02/28/2022 6.9 (H) 4.0 - 6.0 % Final   08/26/2021 6.4 (H) 4.0 - 6.0 % Final     No results found for: LABMICR, HIII10RMQ  Lab Results   Component Value Date     02/28/2022    K 4.3 02/28/2022    CL 97 (L) 02/28/2022    CO2 30 02/28/2022    BUN 21 (H) 02/28/2022    CREATININE 1.17 (H) 02/28/2022    GLUCOSE 121 (H) 02/28/2022    CALCIUM 9.9 02/28/2022    PROT 7.4 02/28/2022    LABALBU 3.9 02/28/2022    BILITOT 0.66 02/28/2022    ALKPHOS 94 02/28/2022    AST 16 02/28/2022    ALT 19 02/28/2022    LABGLOM 47 (L) 02/28/2022    GFRAA 57 (L) 02/28/2022     Lab Results   Component Value Date    CHOL 198 08/12/2020    CHOL 189 01/12/2019    CHOL 240 (H) 07/05/2018     Lab Results   Component Value Date    TRIG 120 08/12/2020    TRIG 112 01/12/2019    TRIG 158 (H) 07/05/2018     Lab Results   Component Value Date    HDL 44 02/28/2022    HDL 52 02/19/2021    HDL 50 08/12/2020     Lab Results   Component Value Date    LDLCHOLESTEROL 101 02/28/2022    LDLCHOLESTEROL 117 02/19/2021    LDLCHOLESTEROL 124 08/12/2020     Lab Results   Component Value Date    VLDL NOT REPORTED (H) 02/19/2021    VLDL NOT REPORTED 08/12/2020    VLDL NOT REPORTED 02/25/2020 Lab Results   Component Value Date    CHOLSALVADORO 4.1 02/28/2022    CHOLHDLRATIO 3.9 02/19/2021    CHOLHDLRATIO 4.0 08/12/2020           Physical Exam  Constitutional:       Appearance: She is well-developed. Eyes:      Pupils: Pupils are equal, round, and reactive to light. Neck:      Thyroid: No thyroid mass, thyromegaly or thyroid tenderness. Cardiovascular:      Rate and Rhythm: Normal rate and regular rhythm. Heart sounds: Normal heart sounds. Pulmonary:      Effort: Pulmonary effort is normal.      Breath sounds: Normal breath sounds. Abdominal:      General: Bowel sounds are normal.      Palpations: Abdomen is soft. Musculoskeletal:      Right lower leg: Edema present. Left lower leg: Edema present. Skin:     General: Skin is warm and dry. Comments: Negative for open/nonhealing wounds. Negative for lipohypertrophy. Venous stasis noted to bilateral lower legs. Neurological:      Mental Status: She is alert and oriented to person, place, and time. Diabetic foot check:Normal strength and range of motion of toes, feet, and ankles bilaterally. No joint deformity. No cyanosis or clubbing. 100% sensation at all 22 test points with the 10 gram filament. Dorsalis pedis pulses intactbilaterally. Capillary refill at the toes was less than 2 seconds. Vibratory sensation (with 128 Hz tuning fork) intact bilaterally. Light touch sensation intact bilaterally. Hair growth present on feet and toes bilaterally. No skin breakdown, erythema, rub spots, blisters, scaling, or ulcers. No calluses or corns. Toenails thin and not ingrown. No evidence of fungal infection. ASSESSMENT/PLAN:     Diagnosis Orders   1. Type 2 diabetes mellitus without complication, without long-term current use of insulin ([facility])  Microalbumin, Ur    HM DIABETES FOOT EXAM    Semaglutide (RYBELSUS) 7 MG TABS   2. Mixed hyperlipidemia     3. Essential hypertension     4.  BMI 60.0-69.9, adult ([facility])     5. Stage 3a chronic kidney disease ([facility] [address]       Orders Placed This Encounter   Procedures    Microalbumin, Ur    HM DIABETES FOOT EXAM     Orders Placed This Encounter   Medications    Semaglutide (RYBELSUS) 7 MG TABS     Sig: Take 7 mg by mouth daily     Dispense:  30 tablet     Refill:  3     Requested Prescriptions     Signed Prescriptions Disp Refills    Semaglutide (RYBELSUS) 7 MG TABS 30 tablet 3     Sig: Take 7 mg by mouth daily       1. Type 2 diabetes mellitus without complication, without long-term current use of insulin ([facility])  - Stable  HbA1C goal is less than 7%. - Fasting blood glucose goal is 70-120mg/dl and postprandial blood sugar goal is less than 180 mg/dl. - Labs reviewed including most recent A1c, microalbumin and kidney function. Repeat labs due in 3 months.    -We discussed in great detail dietary modifications they can make to better improve their blood sugars. --Initial diabetic education completed. Discussed diabetes as a disease and how we can manage it to prevent complications associated with it. Patient Instructions     Low carb diet 45 grams at each meal with two 15 gram snacks. Keep a food log and bring with you to the next appointment. Exercise: increase physical activity gradual up to 150 minutes per week. Incorporate strength and cardio. Medication: increase Rybelsus to 7 mg once daily           Discussed signs and symptoms of hyper/hypoglycemia and how to treat. Encouraged 150 minutes of physical activity per week. Follow a low carbohydrate diet. Encouraged at least 7 hours of sleep. The patient was informed of the goals of diabetes management. This can only be accomplished by watching their diet and exercise levels. We certainly use medicines to help attain these goals. The consequences of not controlling blood sugars were discussed. These include blindness, heart disease, stroke, kidney disease, and possibly need for dialysis.  They were told to be careful with their foot care as diabetics often have nerve damage, infections and risk for limb amutations . They also need a dilated eye exam yearly. We discussed the issues of diet, exercise, medication, complication avoidance, reviewed the signs and symptoms of diabetes, hypoglycemic episodes, significance of HbA1C.       - Microalbumin, Ur; Future  -  DIABETES FOOT EXAM  - Semaglutide (RYBELSUS) 7 MG TABS; Take 7 mg by mouth daily  Dispense: 30 tablet; Refill: 3    2. Mixed hyperlipidemia  stable, lipid panel reviewed, continue current medications. Diet and exercise      3. Essential hypertension   stable, continue current medications. Diet and exercise Seek emergent care if chest pain develops. 4. BMI 60.0-69.9, adult ([facility])  Reduce calories and increase physical activity to achieve a slow and steady weight loss to improve blood pressure, cholesterol and diabetes. 5. Stage 3a chronic kidney disease ([facility])  -stable, avoid nephrotoxic drugs, keep other chronic conditions well controlled and keep follow up appointment with nephrology. Answered all patient questions. Agrees to follow plan of care and to follow up in 1 months, sooner if needed. Call office if unexplained blood sugars less than 70 occur or above 400. Call office or access MyChart with any further questions or concerns. Be sure to bring glucometer/food log at next appointment. Total time spent reviewing chart, labs, counseling patient and documenting on the date of the encounter: 60 min.       Electronically signed by ARISTEO Root CNP on 3/22/2022 at 12:32 PM      (Please note that portions of this note were completed with a voice-recognition program. Efforts were made to edit the dictation but occasionally words are mis-transcribed.)

## 2022-03-22 NOTE — PATIENT INSTRUCTIONS
Low carb diet 45 grams at each meal with two 15 gram snacks. Keep a food log and bring with you to the next appointment. Exercise: increase physical activity gradual up to 150 minutes per week. Incorporate strength and cardio.      Medication: increase Rybelsus to 7 mg once daily

## 2022-04-26 ENCOUNTER — OFFICE VISIT (OUTPATIENT)
Dept: DIABETES SERVICES | Age: 62
End: 2022-04-26
Payer: COMMERCIAL

## 2022-04-26 VITALS
BODY MASS INDEX: 51.91 KG/M2 | WEIGHT: 293 LBS | DIASTOLIC BLOOD PRESSURE: 88 MMHG | HEART RATE: 80 BPM | RESPIRATION RATE: 16 BRPM | HEIGHT: 63 IN | SYSTOLIC BLOOD PRESSURE: 130 MMHG

## 2022-04-26 DIAGNOSIS — E78.2 MIXED HYPERLIPIDEMIA: ICD-10-CM

## 2022-04-26 DIAGNOSIS — I10 ESSENTIAL HYPERTENSION: ICD-10-CM

## 2022-04-26 DIAGNOSIS — E11.9 TYPE 2 DIABETES MELLITUS WITHOUT COMPLICATION, WITHOUT LONG-TERM CURRENT USE OF INSULIN (HCC): Primary | ICD-10-CM

## 2022-04-26 PROCEDURE — 3044F HG A1C LEVEL LT 7.0%: CPT | Performed by: NURSE PRACTITIONER

## 2022-04-26 PROCEDURE — 99214 OFFICE O/P EST MOD 30 MIN: CPT | Performed by: NURSE PRACTITIONER

## 2022-04-26 ASSESSMENT — ENCOUNTER SYMPTOMS
ABDOMINAL PAIN: 0
SHORTNESS OF BREATH: 0
RESPIRATORY NEGATIVE: 1
DIARRHEA: 0

## 2022-04-26 NOTE — PROGRESS NOTES
[facility] [address]  [address] [facility], [address]  [facility] [id number] [phone]        HISTORY:    Fide Jarquin presents today for evaluation and management of:  Chief Complaint   Patient presents with    1 Month Follow-Up     states she think she has to wait till summer until eye appt.  Diabetes       HPI    Interval History:    Past DM Medications   none     Current Diabetic Medications  rybelsus 7 mg     DKA episodes: 0      03/22/22   Fide Jarquin is a 63 y.o. female patient who presents to clinic today for her diabetes. she has a history of HTN, hyperlipidemia and obesity which contributes to her diabetes. She was recently diagnosed and started on rybelsus. she denies any current signs or symptoms of hyper/hypoglycemia. she states they are taking their medications as prescribed without any adverse effects. Diet: weight watches  Exercise: none, has a treadmill  BS testing: none  Issues: denies   Diabetic foot exam up-to-date: No  Diabetic retinal exam up-to-date: No  Hypoglycemia as needed treatment: snack    04/26/22   Fide Jarquin is a 63 y.o. female patient who presents to clinic today for her diabetes. she has a history of HTN, hyperlipidemia and obesity which contributes to her diabetes. At previous visit rybelsus was increased. she denies any current signs or symptoms of hyper/hypoglycemia. she states they are taking their medications as prescribed without any adverse effects. Diet: documenting food intake, smaller portion, more vegetables. Exercise: moving more  BS testing: none,   Issues: denies  Diabetic foot exam up-to-date: Yes  Diabetic retinal exam up-to-date: No  Hypoglycemia as needed treatment: snack    High cholesterol-  Takes zetia and denies any adverse effects with its use. Watches diet and exercise.      Hypertension-  Takes HCTZ, lisinopril and denies any adverse effects with their use.  Watches diet and exercise. Denies any chest pain, dizziness or edema.       Obesity- Working on weight loss. Down 20 lbs       Past Medical History:   Diagnosis Date    Dyslipidemia     Elevated blood pressure     Extreme obesity     Hypothyroidism     IFG (impaired fasting glucose) 8/11/2016    Lower extremity edema     Vitamin D deficiency      Family History   Problem Relation Age of Onset    High Cholesterol Father     High Blood Pressure Father     Heart Disease Father     Coronary Art Dis Paternal Grandmother     High Blood Pressure Brother     High Cholesterol Brother     Diabetes Brother     Breast Cancer Maternal Aunt      Social History     Tobacco Use    Smoking status: Never Smoker    Smokeless tobacco: Never Used   Substance Use Topics    Alcohol use: Yes     Comment: occasional    Drug use: No     Allergies   Allergen Reactions    Atorvastatin      back pain    Penicillins Hives    Pravastatin Other (See Comments)     Made her back and legs hurt,affected her mood       MEDICATIONS:  Current Outpatient Medications   Medication Sig Dispense Refill    Semaglutide (RYBELSUS) 7 MG TABS Take 7 mg by mouth daily 30 tablet 3    ezetimibe (ZETIA) 10 MG tablet Take 1 tablet by mouth daily 90 tablet 1    hydroCHLOROthiazide (HYDRODIURIL) 50 MG tablet Take 1 tablet by mouth every morning 90 tablet 1    lisinopril (PRINIVIL;ZESTRIL) 30 MG tablet TAKE 1 TABLET BY MOUTH ONCE DAILY 90 tablet 1    levothyroxine (EUTHYROX) 88 MCG tablet Take 1 tablet by mouth Daily 90 tablet 3    aspirin 81 MG chewable tablet Take 81 mg by mouth daily      vitamin D (CHOLECALCIFEROL) 1000 UNIT TABS tablet Take 1,000 Units by mouth daily.  Calcium Carbonate-Vitamin D (CALCIUM PLUS VITAMIN D PO) Take  by mouth daily.  Multiple Vitamins-Minerals (MULTIVITAMIN PO) Take  by mouth daily.  UNABLE TO FIND Blood pressure monitor. Check blood pressure twice a week.  1 Device 0    UNABLE TO FIND Blood pressure Value Date    CHOL 198 08/12/2020    CHOL 189 01/12/2019    CHOL 240 (H) 07/05/2018     Lab Results   Component Value Date    TRIG 120 08/12/2020    TRIG 112 01/12/2019    TRIG 158 (H) 07/05/2018     Lab Results   Component Value Date    HDL 44 02/28/2022    HDL 52 02/19/2021    HDL 50 08/12/2020     Lab Results   Component Value Date    LDLCHOLESTEROL 101 02/28/2022    LDLCHOLESTEROL 117 02/19/2021    LDLCHOLESTEROL 124 08/12/2020     Lab Results   Component Value Date    VLDL NOT REPORTED (H) 02/19/2021    VLDL NOT REPORTED 08/12/2020    VLDL NOT REPORTED 02/25/2020     Lab Results   Component Value Date    CHOLHDLRATIO 4.1 02/28/2022    CHOLHDLRATIO 3.9 02/19/2021    CHOLHDLRATIO 4.0 08/12/2020           Physical Exam  Constitutional:       Appearance: She is well-developed. Eyes:      Pupils: Pupils are equal, round, and reactive to light. Neck:      Thyroid: No thyroid mass, thyromegaly or thyroid tenderness. Cardiovascular:      Rate and Rhythm: Normal rate and regular rhythm. Heart sounds: Normal heart sounds. Pulmonary:      Effort: Pulmonary effort is normal.      Breath sounds: Normal breath sounds. Abdominal:      General: Bowel sounds are normal.      Palpations: Abdomen is soft. Skin:     General: Skin is warm and dry. Comments: Negative for open/nonhealing wounds. Negative for lipohypertrophy. Neurological:      Mental Status: She is alert and oriented to person, place, and time. ASSESSMENT/PLAN:     Diagnosis Orders   1. Type 2 diabetes mellitus without complication, without long-term current use of insulin ([address]     2. Mixed hyperlipidemia     3. Essential hypertension     4. BMI 50.0-59.9, adult ([address]       No orders of the defined types were placed in this encounter. No orders of the defined types were placed in this encounter. Requested Prescriptions      No prescriptions requested or ordered in this encounter       1.  Type 2 diabetes mellitus without complication, without long-term current use of insulin ([facility])  - Stable  HbA1C goal is less than 7%. - Fasting blood glucose goal is 70-120mg/dl and postprandial blood sugar goal is less than 180 mg/dl. - Labs reviewed including most recent A1c, microalbumin and kidney function. Repeat labs due in 1 month.    -We discussed in great detail dietary modifications they can make to better improve their blood sugars. -follow up diabetes education completed, all questions answered. -doing well if a1c not at goal will increase Rybelsus and have her start testing at home. She will start walking weekly and look up 1 new low carb diet a week. Discussed signs and symptoms of hyper/hypoglycemia and how to treat. Encouraged 150 minutes of physical activity per week. Follow a low carbohydrate diet. Encouraged at least 7 hours of sleep. The patient was informed of the goals of diabetes management. This can only be accomplished by watching their diet and exercise levels. We certainly use medicines to help attain these goals. The consequences of not controlling blood sugars were discussed. These include blindness, heart disease, stroke, kidney disease, and possibly need for dialysis. They were told to be careful with their foot care as diabetics often have nerve damage, infections and risk for limb amutations . They also need a dilated eye exam yearly. We discussed the issues of diet, exercise, medication, complication avoidance, reviewed the signs and symptoms of diabetes, hypoglycemic episodes, significance of HbA1C.         2. Mixed hyperlipidemia  stable, lipid panel reviewed, continue current medications. Diet and exercise      3. Essential hypertension   stable, continue current medications. Diet and exercise Seek emergent care if chest pain develops.        4. BMI 50.0-59.9, adult ([facility])  Reduce calories and increase physical activity to achieve a slow and steady weight loss to improve blood pressure, cholesterol and diabetes. Answered all patient questions. Agrees to follow plan of care and to follow up in 2 months, sooner if needed. Call office if unexplained blood sugars less than 70 occur or above 400. Call office or access [employer] with any further questions or concerns. Be sure to bring glucometer/food log at next appointment. Total time spent reviewing chart, labs, counseling patient and documenting on the date of the encounter: 30 min.       Electronically signed by ARISTEO Aleman CNP on 4/26/2022 at 12:16 PM      (Please note that portions of this note were completed with a voice-recognition program. Efforts were made to edit the dictation but occasionally words are mis-transcribed.)

## 2022-06-03 ENCOUNTER — HOSPITAL ENCOUNTER (OUTPATIENT)
Dept: LAB | Age: 62
Discharge: HOME | End: 2022-06-03
Payer: COMMERCIAL

## 2022-06-03 DIAGNOSIS — E78.2 MIXED HYPERLIPIDEMIA: ICD-10-CM

## 2022-06-03 DIAGNOSIS — E11.9 TYPE 2 DIABETES MELLITUS WITHOUT COMPLICATION, WITHOUT LONG-TERM CURRENT USE OF INSULIN (HCC): ICD-10-CM

## 2022-06-03 LAB
ALBUMIN SERPL-MCNC: 4 G/DL (ref 3.5–5.2)
ALBUMIN/GLOBULIN RATIO: 1.2 (ref 1–2.5)
ALP BLD-CCNC: 79 U/L (ref 35–104)
ALT SERPL-CCNC: 15 U/L (ref 5–33)
ANION GAP SERPL CALCULATED.3IONS-SCNC: 10 MMOL/L (ref 9–17)
AST SERPL-CCNC: 14 U/L
BILIRUB SERPL-MCNC: 0.49 MG/DL (ref 0.3–1.2)
BUN BLDV-MCNC: 18 MG/DL (ref 6–20)
BUN/CREAT BLD: 16 (ref 9–20)
CALCIUM SERPL-MCNC: 9.5 MG/DL (ref 8.6–10.4)
CHLORIDE BLD-SCNC: 96 MMOL/L (ref 98–107)
CHOLESTEROL/HDL RATIO: 4.4
CHOLESTEROL: 184 MG/DL
CO2: 29 MMOL/L (ref 20–31)
CREAT SERPL-MCNC: 1.15 MG/DL (ref 0.5–0.9)
CREATININE URINE: 140.9 MG/DL (ref 28–217)
ESTIMATED AVERAGE GLUCOSE: 128 MG/DL
GFR AFRICAN AMERICAN: 59 ML/MIN
GFR NON-AFRICAN AMERICAN: 48 ML/MIN
GFR SERPL CREATININE-BSD FRML MDRD: ABNORMAL ML/MIN/{1.73_M2}
GLUCOSE BLD-MCNC: 96 MG/DL (ref 70–99)
HBA1C MFR BLD: 6.1 % (ref 4–6)
HDLC SERPL-MCNC: 42 MG/DL
LDL CHOLESTEROL: 108 MG/DL (ref 0–130)
MICROALBUMIN/CREAT 24H UR: <12 MG/L
MICROALBUMIN/CREAT UR-RTO: NORMAL MCG/MG CREAT
POTASSIUM SERPL-SCNC: 4.1 MMOL/L (ref 3.7–5.3)
SODIUM BLD-SCNC: 135 MMOL/L (ref 135–144)
TOTAL PROTEIN: 7.4 G/DL (ref 6.4–8.3)
TRIGL SERPL-MCNC: 169 MG/DL

## 2022-06-03 PROCEDURE — 80053 COMPREHEN METABOLIC PANEL: CPT

## 2022-06-03 PROCEDURE — 82043 UR ALBUMIN QUANTITATIVE: CPT

## 2022-06-03 PROCEDURE — 80061 LIPID PANEL: CPT

## 2022-06-03 PROCEDURE — 82570 ASSAY OF URINE CREATININE: CPT

## 2022-06-03 PROCEDURE — 83036 HEMOGLOBIN GLYCOSYLATED A1C: CPT

## 2022-06-03 PROCEDURE — 36415 COLL VENOUS BLD VENIPUNCTURE: CPT

## 2022-06-07 ENCOUNTER — OFFICE VISIT (OUTPATIENT)
Dept: FAMILY MEDICINE CLINIC | Age: 62
End: 2022-06-07
Payer: COMMERCIAL

## 2022-06-07 VITALS
BODY MASS INDEX: 51.91 KG/M2 | TEMPERATURE: 97.5 F | SYSTOLIC BLOOD PRESSURE: 134 MMHG | HEART RATE: 88 BPM | OXYGEN SATURATION: 98 % | DIASTOLIC BLOOD PRESSURE: 74 MMHG | WEIGHT: 293 LBS | HEIGHT: 63 IN

## 2022-06-07 DIAGNOSIS — E78.2 MIXED HYPERLIPIDEMIA: ICD-10-CM

## 2022-06-07 DIAGNOSIS — E66.01 SEVERE OBESITY (BMI >= 40) (HCC): ICD-10-CM

## 2022-06-07 DIAGNOSIS — L98.9 SKIN LESION OF BACK: ICD-10-CM

## 2022-06-07 DIAGNOSIS — E11.9 TYPE 2 DIABETES MELLITUS WITHOUT COMPLICATION, WITHOUT LONG-TERM CURRENT USE OF INSULIN (HCC): Primary | ICD-10-CM

## 2022-06-07 DIAGNOSIS — N18.30 CHRONIC RENAL IMPAIRMENT, STAGE 3 (MODERATE), UNSPECIFIED WHETHER STAGE 3A OR 3B CKD (HCC): ICD-10-CM

## 2022-06-07 PROCEDURE — 99214 OFFICE O/P EST MOD 30 MIN: CPT | Performed by: FAMILY MEDICINE

## 2022-06-07 PROCEDURE — 3044F HG A1C LEVEL LT 7.0%: CPT | Performed by: FAMILY MEDICINE

## 2022-06-07 RX ORDER — ORAL SEMAGLUTIDE 14 MG/1
14 TABLET ORAL DAILY
Qty: 30 TABLET | Refills: 2 | Status: SHIPPED | OUTPATIENT
Start: 2022-06-07 | End: 2022-08-31

## 2022-06-07 ASSESSMENT — PATIENT HEALTH QUESTIONNAIRE - PHQ9
SUM OF ALL RESPONSES TO PHQ QUESTIONS 1-9: 0
1. LITTLE INTEREST OR PLEASURE IN DOING THINGS: 0
2. FEELING DOWN, DEPRESSED OR HOPELESS: 0
SUM OF ALL RESPONSES TO PHQ QUESTIONS 1-9: 0
SUM OF ALL RESPONSES TO PHQ9 QUESTIONS 1 & 2: 0

## 2022-06-07 NOTE — PROGRESS NOTES
Patient declines pneumo, hep B, Tdap, and colonoscopy. Plans to get covid booster in the fall.      Patient has an appt set up for diabetic eye exam.

## 2022-06-07 NOTE — PROGRESS NOTES
PRASANTH CHERRY [address], [address]                        Telephone [phone]             Fax [phone]       Urszula Venegas  :  1960  Age:  63 y.o. MRN:  [id number]  Date of visit:  2022       Assessment and Plan:    1. Type 2 diabetes mellitus without complication, without long-term current use of insulin ([facility])  Her HgbA1c was 6.1 %, which is improved. She is tolerating Rybelsus. I recommended increasing the dose from 7 mg daily to 14 mg daily:  - Semaglutide (RYBELSUS) 14 MG TABS; Take 14 mg by mouth daily  Dispense: 30 tablet; Refill: 2    - Hemoglobin A1C; Future was ordered to be done prior to her return visit in 3 months. 2. Mixed hyperlipidemia  Her LDL was worse on her recent lipid profile. She has been intolerant of statins. I discussed with the patient that weight loss may improve the lipid profile. She is tolerating Zetia. She states that she does not need a refill today. Labs were ordered to be done prior to her return visit in 3 months:   - Comprehensive Metabolic Panel; Future  - Lipid Panel; Future    3. Chronic renal impairment, stage 3 (moderate), unspecified whether stage 3a or 3b CKD ([facility])  The creatinine was elevated (1.15 mg/dL) on her recent labs. This is stable. 4. Severe obesity (BMI >= 40) ([facility])  She has lost 24 pounds in the past 3 months. She was congratulated on the weight loss. The dose of Rybelsus was increased from 7 mg to 14 mg as above. 5. Skin lesion of back  I advised the patient that this is consistent with a seborrheic keratosis. She was advised to monitor the lesion and report any changes. 6.  Routine health maintenance  Health maintenance was reviewed with the patient. She has received one dose of the [employer] Covid-19 vaccine, and one dose of the Moderna Covid-19 vaccine.   She was advised to get an additional dose of an mRNA vaccine. Colonoscopy was recommended. She declined. Other methods of screening for colon cancer including FIT testing and fecal DNA testing were discussed. The patient declined any screening for colon cancer. Pneumonia vaccination, hepatitis B vaccination, and Tdap were recommended. All other health maintenance, including Shingrix, is up to date at this time. Follow up instructions were given to the patient:  Return in about 3 months (around 9/7/2022) for diabetes, hyperlipidemia. Subjective:    Collin Simms is a 63 y.o. female who presents to [place] [address] today (6/7/2022) for follow up/evaluation of:  3 Month Follow-Up, Diabetes, and Other (Patient has an area on the mid right side of back. Area is raised and brown in color. No pain or drainage.)      She states that she has been feeling well. She is tolerating her medications well. She denies any issues with nausea with her current dose of Rybelsus. She is using the Weight [employer] program also. She has not been exercising regularly. The only new concern that she has today is that she just noticed a skin lesion on the right side of her back. This does not itch, and it is not painful. She has received one dose of the [employer] Covid-19 vaccine, and one dose of the Moderna Covid-19 vaccine. The most recent dose was 12/1/2021.          Immunization history was reviewed:  Immunization History   Administered Date(s) Administered    COVID-19, J&J, PF, 0.5 mL 03/24/2021    COVID-19, Moderna, Booster, PF, 50mcg/0.25ml 12/01/2021    Influenza Vaccine, unspecified formulation 10/14/2010, 10/08/2013, 10/01/2017    Influenza Virus Vaccine 01/01/2012, 10/10/2013, 10/05/2016, 10/01/2017, 10/17/2019, 10/25/2021    Influenza Whole 10/14/2008, 10/13/2009, 01/01/2012, 10/16/2012    Influenza, MDCK Quadv, IM, PF (Flucelvax 2 yrs and older) 10/19/2020    Influenza, Quadv, 6 mo and older, IM, PF (Flulaval, Fluarix) 10/16/2018    Influenza, [place], IM, PF (6 mo and older Fluzone, Flulaval, Fluarix, and 3 yrs and older Afluria) 10/27/2015, 10/05/2016, 10/16/2018, 10/17/2019, 10/25/2021    Zoster Recombinant (Shingrix) 05/12/2020, 07/31/2020          She has the following problem list:  Patient Active Problem List   Diagnosis    Acquired hypothyroidism    Vitamin D deficiency    Extreme obesity    Mixed hyperlipidemia    Type 2 diabetes mellitus without complication, without long-term current use of insulin ([facility] [address]    High risk medication use    Essential hypertension        Current medications are:  Outpatient Medications Marked as Taking for the 6/7/22 encounter (Office Visit) with Markus Montes MD   Medication Sig Dispense Refill    Semaglutide (RYBELSUS) 7 MG TABS Take 7 mg by mouth daily 30 tablet 3    ezetimibe (ZETIA) 10 MG tablet Take 1 tablet by mouth daily 90 tablet 1    hydroCHLOROthiazide (HYDRODIURIL) 50 MG tablet Take 1 tablet by mouth every morning 90 tablet 1    lisinopril (PRINIVIL;ZESTRIL) 30 MG tablet TAKE 1 TABLET BY MOUTH ONCE DAILY 90 tablet 1    levothyroxine (EUTHYROX) 88 MCG tablet Take 1 tablet by mouth Daily 90 tablet 3    UNABLE TO FIND Blood pressure monitor. Check blood pressure twice a week. 1 Device 0    UNABLE TO FIND Blood pressure monitor. Check blood pressure twice a week. 1 Device 0    Blood Pressure KIT Check blood pressure daily prn. 1 kit 0    aspirin 81 MG chewable tablet Take 81 mg by mouth daily      vitamin D (CHOLECALCIFEROL) 1000 UNIT TABS tablet Take 1,000 Units by mouth daily.  Calcium Carbonate-Vitamin D (CALCIUM PLUS VITAMIN D PO) Take  by mouth daily.  Multiple Vitamins-Minerals (MULTIVITAMIN PO) Take  by mouth daily. She is allergic to atorvastatin, penicillins, and pravastatin. She  reports that she has never smoked.  She has never used smokeless tobacco.      Objective:    Vitals:    06/07/22 1119   BP: 134/74   Site: Right Upper Arm   Position: Sitting   Cuff Size: Large Adult   Pulse: 88   Temp: 97.5 °F (36.4 °C)   TempSrc: Temporal   SpO2: 98%   Weight: (!) 325 lb 9.6 oz (147.7 kg)   Height: 5' 3\" (1.6 m)     Body mass index is 57.68 kg/m². Wt Readings from Last 3 Encounters:   06/07/22 (!) 325 lb 9.6 oz (147.7 kg)   04/26/22 (!) 329 lb (149.2 kg)   03/22/22 (!) 349 lb (158.3 kg)        Extremely obese female, alert, cooperative and in no acute distress. Neck supple. No adenopathy. Thyroid symmetric, normal size. Chest:  Normal expansion. Clear to auscultation. No rales, rhonchi, or wheezing. Heart sounds are normal.  Regular rate and rhythm without murmur, gallop or rub. Lower extremities have 1-2+ edema bilaterally. There is a 6 mm x 7 mm pigmented lesion inferior to the scapula on the right side of the back. Results of labs done 6/3/2022 were reviewed with the patient:   Hospital Outpatient Visit on 06/03/2022   Component Date Value Ref Range Status    Cholesterol 06/03/2022 184  <200 mg/dL Final    Comment:    Cholesterol Guidelines:      <200  Desirable   200-240  Borderline      >240  Undesirable         HDL 06/03/2022 42  >40 mg/dL Final    Comment:    HDL Guidelines:    <40     Undesirable   40-59    Borderline    >59     Desirable         LDL Cholesterol 06/03/2022 108  0 - 130 mg/dL Final    Comment:    LDL Guidelines:     <100    Desirable   100-129   Near to/above Desirable   130-159   Borderline      >159   Undesirable     Direct (measured) LDL and calculated LDL are not interchangeable tests.  Chol/HDL Ratio 06/03/2022 4.4  <5 Final            Triglycerides 06/03/2022 169* <150 mg/dL Final    Comment:    Triglyceride Guidelines:     <150   Desirable   150-199  Borderline   200-499  High     >499   Very high   Based on AHA Guidelines for fasting triglyceride, October 2012.          Glucose 06/03/2022 96  70 - 99 mg/dL Final    BUN 06/03/2022 18  6 - 20 mg/dL Final    CREATININE 06/03/2022 1.15* 0.50 - 0.90 mg/dL Final    Bun/Cre Ratio 06/03/2022 16  9 - 20 Final    Calcium 06/03/2022 9.5  8.6 - 10.4 mg/dL Final    Sodium 06/03/2022 135  135 - 144 mmol/L Final    Potassium 06/03/2022 4.1  3.7 - 5.3 mmol/L Final    Chloride 06/03/2022 96* 98 - 107 mmol/L Final    CO2 06/03/2022 29  20 - 31 mmol/L Final    Anion Gap 06/03/2022 10  9 - 17 mmol/L Final    Alkaline Phosphatase 06/03/2022 79  35 - 104 U/L Final    ALT 06/03/2022 15  5 - 33 U/L Final    AST 06/03/2022 14  <32 U/L Final    Total Bilirubin 06/03/2022 0.49  0.3 - 1.2 mg/dL Final    Total Protein 06/03/2022 7.4  6.4 - 8.3 g/dL Final    Albumin 06/03/2022 4.0  3.5 - 5.2 g/dL Final    Albumin/Globulin Ratio 06/03/2022 1.2  1.0 - 2.5 Final    GFR Non- 06/03/2022 48* >60 mL/min Final    GFR  06/03/2022 59* >60 mL/min Final    GFR Comment 06/03/2022        Final    Comment: Average GFR for 52-63 years old:   80 mL/min/1.73sq m  Chronic Kidney Disease:   <60 mL/min/1.73sq m  Kidney failure:   <15 mL/min/1.73sq m              eGFR calculated using average adult body mass. Additional eGFR calculator available at:        [email or web address]            Hemoglobin A1C 06/03/2022 6.1* 4.0 - 6.0 % Final    Estimated Avg Glucose 06/03/2022 128  mg/dL Final    Comment: The ADA and AACC recommend providing the estimated average glucose result to permit better   patient understanding of their HBA1c result.              (Please note that portions of this note were completed with a voice-recognition program. Efforts were made to edit the dictation but occasionally words are mis-transcribed.)

## 2022-06-08 ENCOUNTER — OFFICE VISIT (OUTPATIENT)
Dept: DIABETES SERVICES | Age: 62
End: 2022-06-08
Payer: COMMERCIAL

## 2022-06-08 VITALS
DIASTOLIC BLOOD PRESSURE: 86 MMHG | HEART RATE: 96 BPM | WEIGHT: 293 LBS | RESPIRATION RATE: 16 BRPM | HEIGHT: 63 IN | SYSTOLIC BLOOD PRESSURE: 112 MMHG | BODY MASS INDEX: 51.91 KG/M2

## 2022-06-08 DIAGNOSIS — N18.31 STAGE 3A CHRONIC KIDNEY DISEASE (HCC): ICD-10-CM

## 2022-06-08 DIAGNOSIS — E11.9 TYPE 2 DIABETES MELLITUS WITHOUT COMPLICATION, WITHOUT LONG-TERM CURRENT USE OF INSULIN (HCC): Primary | ICD-10-CM

## 2022-06-08 DIAGNOSIS — E78.2 MIXED HYPERLIPIDEMIA: ICD-10-CM

## 2022-06-08 DIAGNOSIS — I10 ESSENTIAL HYPERTENSION: ICD-10-CM

## 2022-06-08 PROCEDURE — 99214 OFFICE O/P EST MOD 30 MIN: CPT | Performed by: NURSE PRACTITIONER

## 2022-06-08 PROCEDURE — 3044F HG A1C LEVEL LT 7.0%: CPT | Performed by: NURSE PRACTITIONER

## 2022-06-08 ASSESSMENT — ENCOUNTER SYMPTOMS
ABDOMINAL PAIN: 0
RESPIRATORY NEGATIVE: 1
DIARRHEA: 0
SHORTNESS OF BREATH: 0

## 2022-06-08 NOTE — PROGRESS NOTES
[address], [address]  [facility] [address] [phone]        HISTORY:    Tayler Garrido presents today for evaluation and management of:  Chief Complaint   Patient presents with    Diabetes     2 month appt. eye appt scheduled for 6/27/2022       HPI    Interval History:    Past DM Medications   none     Current Diabetic Medications  rybelsus 14 mg     DKA episodes: 0    04/26/22   Tayler Garrido is a 63 y.o. female patient who presents to clinic today for her diabetes. she has a history of HTN, hyperlipidemia and obesity which contributes to her diabetes. At previous visit rybelsus was increased. she denies any current signs or symptoms of hyper/hypoglycemia. she states they are taking their medications as prescribed without any adverse effects. Diet: documenting food intake, smaller portion, more vegetables. Exercise: moving more  BS testing: none,   Issues: denies  Diabetic foot exam up-to-date: Yes  Diabetic retinal exam up-to-date: No  Hypoglycemia as needed treatment: snack    06/08/22   Tayler Garrido is a 63 y.o. female patient who presents to clinic today for her diabetes. she has a history of HTN, hyperlipidemia and obesity  which contributes to her diabetes. At previous visit diabetes counseling was provided. she denies any current signs or symptoms of hyper/hypoglycemia. she states they are taking their medications as prescribed without any adverse effects.  She was recently seen by pcp and rybelsus was increased   Diet: documenting food intake,  Exercise: walking a few times a week   BS testing: none   Issues:   Diabetic foot exam up-to-date: Yes  Diabetic retinal exam up-to-date: No has appt this month   Hypoglycemia as needed treatment: snack     High cholesterol-  Takes zetia and denies any adverse effects with its use.  Watches diet and exercise.      Hypertension-  Takes HCTZ, lisinopril and denies any adverse effects with their use. Watches diet and exercise. Denies any chest pain, dizziness or edema.       Obesity- Working on weight loss. Past Medical History:   Diagnosis Date    Dyslipidemia     Elevated blood pressure     Extreme obesity     Hypothyroidism     IFG (impaired fasting glucose) 8/11/2016    Lower extremity edema     Vitamin D deficiency      Family History   Problem Relation Age of Onset    High Cholesterol Father     High Blood Pressure Father     Heart Disease Father     Coronary Art Dis Paternal Grandmother     High Blood Pressure Brother     High Cholesterol Brother     Diabetes Brother     Breast Cancer Maternal Aunt      Social History     Tobacco Use    Smoking status: Never Smoker    Smokeless tobacco: Never Used   Substance Use Topics    Alcohol use: Yes     Comment: occasional    Drug use: No     Allergies   Allergen Reactions    Atorvastatin      back pain    Penicillins Hives    Pravastatin Other (See Comments)     Made her back and legs hurt,affected her mood       MEDICATIONS:  Current Outpatient Medications   Medication Sig Dispense Refill    Semaglutide (RYBELSUS) 14 MG TABS Take 14 mg by mouth daily 30 tablet 2    ezetimibe (ZETIA) 10 MG tablet Take 1 tablet by mouth daily 90 tablet 1    hydroCHLOROthiazide (HYDRODIURIL) 50 MG tablet Take 1 tablet by mouth every morning 90 tablet 1    lisinopril (PRINIVIL;ZESTRIL) 30 MG tablet TAKE 1 TABLET BY MOUTH ONCE DAILY 90 tablet 1    levothyroxine (EUTHYROX) 88 MCG tablet Take 1 tablet by mouth Daily 90 tablet 3    aspirin 81 MG chewable tablet Take 81 mg by mouth daily      vitamin D (CHOLECALCIFEROL) 1000 UNIT TABS tablet Take 1,000 Units by mouth daily.  Calcium Carbonate-Vitamin D (CALCIUM PLUS VITAMIN D PO) Take  by mouth daily.  Multiple Vitamins-Minerals (MULTIVITAMIN PO) Take  by mouth daily.       Blood Pressure KIT Check blood pressure daily prn. 1 kit 0 No current facility-administered medications for this visit. Review ofSymptoms:  Review of Systems   Constitutional: Positive for fatigue. Negative for unexpected weight change. Eyes: Negative for visual disturbance. Respiratory: Negative. Negative for shortness of breath. Cardiovascular: Negative for chest pain and leg swelling. Gastrointestinal: Negative for abdominal pain and diarrhea. Endocrine: Negative for polydipsia, polyphagia and polyuria. Genitourinary: Negative. Musculoskeletal: Negative. Skin: Negative for rash and wound. Neurological: Negative for dizziness, tremors, seizures and headaches. Psychiatric/Behavioral: Negative. Negative for confusion and decreased concentration. Theremainder of a complete 14-point review of systems is negative. Vital Signs: /86 (Site: Right Upper Arm, Position: Sitting, Cuff Size: Large Adult)   Pulse 96   Resp 16   Ht 5' 3\" (1.6 m)   Wt (!) 324 lb (147 kg)   LMP 01/01/2011   BMI 57.39 kg/m²      Wt Readings from Last 3 Encounters:   06/08/22 (!) 324 lb (147 kg)   06/07/22 (!) 325 lb 9.6 oz (147.7 kg)   04/26/22 (!) 329 lb (149.2 kg)     Body mass index is 57.39 kg/m².   LABS:  Hemoglobin A1C   Date Value Ref Range Status   06/03/2022 6.1 (H) 4.0 - 6.0 % Final   02/28/2022 6.9 (H) 4.0 - 6.0 % Final     Lab Results   Component Value Date    LABMICR Can not be calculated 06/03/2022     Lab Results   Component Value Date     06/03/2022    K 4.1 06/03/2022    CL 96 (L) 06/03/2022    CO2 29 06/03/2022    BUN 18 06/03/2022    CREATININE 1.15 (H) 06/03/2022    GLUCOSE 96 06/03/2022    CALCIUM 9.5 06/03/2022    PROT 7.4 06/03/2022    LABALBU 4.0 06/03/2022    BILITOT 0.49 06/03/2022    ALKPHOS 79 06/03/2022    AST 14 06/03/2022    ALT 15 06/03/2022    LABGLOM 48 (L) 06/03/2022    GFRAA 59 (L) 06/03/2022     Lab Results   Component Value Date    CHOL 184 06/03/2022    CHOL 198 08/12/2020    CHOL 189 01/12/2019     Lab Results   Component Value Date    TRIG 169 (H) 06/03/2022    TRIG 120 08/12/2020    TRIG 112 01/12/2019     Lab Results   Component Value Date    HDL 42 06/03/2022    HDL 44 02/28/2022    HDL 52 02/19/2021     Lab Results   Component Value Date    LDLCHOLESTEROL 108 06/03/2022    LDLCHOLESTEROL 101 02/28/2022    LDLCHOLESTEROL 117 02/19/2021     Lab Results   Component Value Date    VLDL NOT REPORTED (H) 02/19/2021    VLDL NOT REPORTED 08/12/2020    VLDL NOT REPORTED 02/25/2020     Lab Results   Component Value Date    CHOLHDLRATIO 4.4 06/03/2022    CHOLHDLRATIO 4.1 02/28/2022    CHOLHDLRATIO 3.9 02/19/2021           Physical Exam  Constitutional:       Appearance: She is well-developed. Eyes:      Pupils: Pupils are equal, round, and reactive to light. Neck:      Thyroid: No thyroid mass, thyromegaly or thyroid tenderness. Cardiovascular:      Rate and Rhythm: Normal rate and regular rhythm. Heart sounds: Normal heart sounds. Pulmonary:      Effort: Pulmonary effort is normal.      Breath sounds: Normal breath sounds. Abdominal:      General: Bowel sounds are normal.      Palpations: Abdomen is soft. Skin:     General: Skin is warm and dry. Comments: Negative for open/nonhealing wounds. Negative for lipohypertrophy. Neurological:      Mental Status: She is alert and oriented to person, place, and time. ASSESSMENT/PLAN:     Diagnosis Orders   1. Type 2 diabetes mellitus without complication, without long-term current use of insulin ([facility] [address]     2. Mixed hyperlipidemia     3. Essential hypertension     4. BMI 50.0-59.9, adult (HCC)     5. Stage 3a chronic kidney disease ([address]       No orders of the defined types were placed in this encounter. No orders of the defined types were placed in this encounter. Requested Prescriptions      No prescriptions requested or ordered in this encounter       1.  Type 2 diabetes mellitus without complication, without long-term current use of insulin (HCC)  - Stable  HbA1C goal is less than 7%. - Fasting blood glucose goal is 70-120mg/dl and postprandial blood sugar goal is less than 180 mg/dl. - Labs reviewed including most recent A1c, microalbumin and kidney function. Repeat labs due in 3 months.    -We discussed in great detail dietary modifications they can make to better improve their blood sugars. -follow up diabetes education completed, all questions answered. Discussed signs and symptoms of hyper/hypoglycemia and how to treat. Encouraged 150 minutes of physical activity per week. Follow a low carbohydrate diet. Encouraged at least 7 hours of sleep. The patient was informed of the goals of diabetes management. This can only be accomplished by watching their diet and exercise levels. We certainly use medicines to help attain these goals. The consequences of not controlling blood sugars were discussed. These include blindness, heart disease, stroke, kidney disease, and possibly need for dialysis. They were told to be careful with their foot care as diabetics often have nerve damage, infections and risk for limb amutations . They also need a dilated eye exam yearly. We discussed the issues of diet, exercise, medication, complication avoidance, reviewed the signs and symptoms of diabetes, hypoglycemic episodes, significance of HbA1C.         2. Mixed hyperlipidemia  stable, lipid panel reviewed, continue current medications. Diet and exercise      3. Essential hypertension   stable, continue current medications. Diet and exercise Seek emergent care if chest pain develops. 4. BMI 50.0-59.9, adult (HCC)  Reduce calories and increase physical activity to achieve a slow and steady weight loss to improve blood pressure, cholesterol and diabetes. 5. Stage 3a chronic kidney disease (HCC)  -stable, avoid nephrotoxic drugs, keep other chronic conditions well controlled and keep follow up appointment with nephrology.              Answered all patient questions. Agrees to follow plan of care and to follow up in 3 months, sooner if needed. Call office if unexplained blood sugars less than 70 occur or above 400. Call office or access [employer] with any further questions or concerns. Be sure to bring glucometer/food log at next appointment. Total time spent reviewing chart, labs, counseling patient and documenting on the date of the encounter: 30 min.       Electronically signed by ARISTEO Goldberg CNP on 6/8/2022 at 11:24 AM      (Please note that portions of this note were completed with a voice-recognition program. Efforts were made to edit the dictation but occasionally words are mis-transcribed.)

## 2022-06-25 DIAGNOSIS — E78.2 MIXED HYPERLIPIDEMIA: ICD-10-CM

## 2022-06-27 RX ORDER — EZETIMIBE 10 MG/1
TABLET ORAL
Qty: 90 TABLET | Refills: 1 | OUTPATIENT
Start: 2022-06-27

## 2022-08-27 DIAGNOSIS — I10 ESSENTIAL HYPERTENSION: ICD-10-CM

## 2022-08-27 DIAGNOSIS — R60.0 BILATERAL LOWER EXTREMITY EDEMA: ICD-10-CM

## 2022-08-29 NOTE — TELEPHONE ENCOUNTER
Quinnie Files called requesting a refill of the below medication which has been pended for you:     Requested Prescriptions     Pending Prescriptions Disp Refills    hydroCHLOROthiazide (HYDRODIURIL) 50 MG tablet [Pharmacy Med Name: HYDROCHLOROTHIAZIDE 50 MG TAB] 90 tablet 1     Sig: take 1 tablet by mouth every morning       Last Appointment Date: 6/7/2022  Next Appointment Date: 9/7/2022    Allergies   Allergen Reactions    Atorvastatin      back pain    Penicillins Hives    Pravastatin Other (See Comments)     Made her back and legs hurt,affected her mood

## 2022-08-30 DIAGNOSIS — I10 ESSENTIAL HYPERTENSION: ICD-10-CM

## 2022-08-30 RX ORDER — LISINOPRIL 30 MG/1
TABLET ORAL
Qty: 30 TABLET | Refills: 0 | Status: SHIPPED | OUTPATIENT
Start: 2022-08-30 | End: 2022-09-07 | Stop reason: SDUPTHER

## 2022-08-30 RX ORDER — HYDROCHLOROTHIAZIDE 50 MG/1
50 TABLET ORAL EVERY MORNING
Qty: 90 TABLET | Refills: 0 | Status: SHIPPED | OUTPATIENT
Start: 2022-08-30 | End: 2022-11-29

## 2022-08-31 DIAGNOSIS — E11.9 TYPE 2 DIABETES MELLITUS WITHOUT COMPLICATION, WITHOUT LONG-TERM CURRENT USE OF INSULIN (HCC): ICD-10-CM

## 2022-08-31 RX ORDER — ORAL SEMAGLUTIDE 14 MG/1
TABLET ORAL
Qty: 30 TABLET | Refills: 0 | Status: SHIPPED | OUTPATIENT
Start: 2022-08-31 | End: 2022-09-14 | Stop reason: SDUPTHER

## 2022-08-31 NOTE — TELEPHONE ENCOUNTER
Glo Muhammad called requesting a refill of the below medication which has been pended for you:     Requested Prescriptions     Pending Prescriptions Disp Refills    RYBELSUS 14 MG TABS [Pharmacy Med Name: RYBELSUS 14 MG TABLET] 30 tablet 0     Sig: take 1 tablet by mouth once daily       Last Appointment Date: 6/7/2022  Next Appointment Date: 9/7/2022    Allergies   Allergen Reactions    Atorvastatin      back pain    Penicillins Hives    Pravastatin Other (See Comments)     Made her back and legs hurt,affected her mood

## 2022-09-01 ENCOUNTER — HOSPITAL ENCOUNTER (OUTPATIENT)
Dept: LAB | Age: 62
Discharge: HOME | End: 2022-09-01
Payer: COMMERCIAL

## 2022-09-01 DIAGNOSIS — E78.2 MIXED HYPERLIPIDEMIA: ICD-10-CM

## 2022-09-01 DIAGNOSIS — E11.9 TYPE 2 DIABETES MELLITUS WITHOUT COMPLICATION, WITHOUT LONG-TERM CURRENT USE OF INSULIN (HCC): ICD-10-CM

## 2022-09-01 LAB
ALBUMIN SERPL-MCNC: 3.9 G/DL (ref 3.5–5.2)
ALBUMIN/GLOBULIN RATIO: 1.2 (ref 1–2.5)
ALP BLD-CCNC: 85 U/L (ref 35–104)
ALT SERPL-CCNC: 12 U/L (ref 5–33)
ANION GAP SERPL CALCULATED.3IONS-SCNC: 8 MMOL/L (ref 9–17)
AST SERPL-CCNC: 12 U/L
BILIRUB SERPL-MCNC: 0.6 MG/DL (ref 0.3–1.2)
BUN BLDV-MCNC: 24 MG/DL (ref 8–23)
BUN/CREAT BLD: 18 (ref 9–20)
CALCIUM SERPL-MCNC: 9.7 MG/DL (ref 8.6–10.4)
CHLORIDE BLD-SCNC: 102 MMOL/L (ref 98–107)
CHOLESTEROL/HDL RATIO: 4.4
CHOLESTEROL: 172 MG/DL
CO2: 30 MMOL/L (ref 20–31)
CREAT SERPL-MCNC: 1.37 MG/DL (ref 0.5–0.9)
ESTIMATED AVERAGE GLUCOSE: 111 MG/DL
GFR AFRICAN AMERICAN: 48 ML/MIN
GFR NON-AFRICAN AMERICAN: 39 ML/MIN
GFR SERPL CREATININE-BSD FRML MDRD: ABNORMAL ML/MIN/{1.73_M2}
GLUCOSE BLD-MCNC: 108 MG/DL (ref 70–99)
HBA1C MFR BLD: 5.5 % (ref 4–6)
HDLC SERPL-MCNC: 39 MG/DL
LDL CHOLESTEROL: 107 MG/DL (ref 0–130)
POTASSIUM SERPL-SCNC: 4.4 MMOL/L (ref 3.7–5.3)
SODIUM BLD-SCNC: 140 MMOL/L (ref 135–144)
TOTAL PROTEIN: 7.2 G/DL (ref 6.4–8.3)
TRIGL SERPL-MCNC: 130 MG/DL

## 2022-09-01 PROCEDURE — 80053 COMPREHEN METABOLIC PANEL: CPT

## 2022-09-01 PROCEDURE — 36415 COLL VENOUS BLD VENIPUNCTURE: CPT

## 2022-09-01 PROCEDURE — 80061 LIPID PANEL: CPT

## 2022-09-01 PROCEDURE — 83036 HEMOGLOBIN GLYCOSYLATED A1C: CPT

## 2022-09-07 ENCOUNTER — OFFICE VISIT (OUTPATIENT)
Dept: FAMILY MEDICINE CLINIC | Age: 62
End: 2022-09-07
Payer: COMMERCIAL

## 2022-09-07 VITALS
DIASTOLIC BLOOD PRESSURE: 74 MMHG | TEMPERATURE: 97.6 F | BODY MASS INDEX: 51.91 KG/M2 | HEIGHT: 63 IN | WEIGHT: 293 LBS | OXYGEN SATURATION: 98 % | SYSTOLIC BLOOD PRESSURE: 126 MMHG | HEART RATE: 76 BPM

## 2022-09-07 DIAGNOSIS — E11.9 TYPE 2 DIABETES MELLITUS WITHOUT COMPLICATION, WITHOUT LONG-TERM CURRENT USE OF INSULIN (HCC): ICD-10-CM

## 2022-09-07 DIAGNOSIS — E03.9 ACQUIRED HYPOTHYROIDISM: ICD-10-CM

## 2022-09-07 DIAGNOSIS — E78.2 MIXED HYPERLIPIDEMIA: ICD-10-CM

## 2022-09-07 DIAGNOSIS — I10 ESSENTIAL HYPERTENSION: Primary | ICD-10-CM

## 2022-09-07 PROCEDURE — 99214 OFFICE O/P EST MOD 30 MIN: CPT | Performed by: FAMILY MEDICINE

## 2022-09-07 PROCEDURE — 3044F HG A1C LEVEL LT 7.0%: CPT | Performed by: FAMILY MEDICINE

## 2022-09-07 RX ORDER — ORAL SEMAGLUTIDE 14 MG/1
TABLET ORAL
Qty: 90 TABLET | Refills: 0 | Status: CANCELLED | OUTPATIENT
Start: 2022-09-07

## 2022-09-07 RX ORDER — LEVOTHYROXINE SODIUM 88 UG/1
88 TABLET ORAL DAILY
Qty: 90 TABLET | Refills: 3 | Status: SHIPPED | OUTPATIENT
Start: 2022-09-07 | End: 2022-12-06

## 2022-09-07 RX ORDER — LISINOPRIL 30 MG/1
TABLET ORAL
Qty: 90 TABLET | Refills: 0 | Status: SHIPPED | OUTPATIENT
Start: 2022-09-07

## 2022-09-07 NOTE — PROGRESS NOTES
PRASANTH CHERRY [address] [place], [address]                        Telephone [phone]             Fax [phone]       Lamont Gambinoland  :  1960  Age:  63 y.o. MRN:  [id number]  Date of visit:  2022       Assessment and Plan:    1. Essential hypertension  Her blood pressure is well-controlled today. (BP: 126/74)   She was advised to continue current medications. Lisinopril  was refilled:   - lisinopril (PRINIVIL;ZESTRIL) 30 MG tablet; take 1 tablet by mouth daily  Dispense: 90 tablet; Refill: 0    2. Mixed hyperlipidemia  Her lipid profile was improved on her recent lab work. I discussed with the patient that the lipid panel is not at goal.. Risks and benefits of statin therapy were discussed. She declined beginning a statin. Labs were ordered to be done prior to her return visit in 6 months:   - Comprehensive Metabolic Panel; Future  - Lipid Panel; Future    3. Acquired hypothyroidism  TSH and free T4 were within normal limits on 2022:  Lab Results   Component Value Date/Time    TSH 2.55 2022 10:39 AM    THYROXINE 1.35 2022 10:39 AM      She was advised to continue with her current dose of Levothyroxine. Levothyroxine was refilled:   - levothyroxine (EUTHYROX) 88 MCG tablet; Take 1 tablet by mouth Daily  Dispense: 90 tablet; Refill: 3    4. Type 2 diabetes mellitus without complication, without long-term current use of insulin (HCC)  Her HgbA1c was excellent (5.5 %) on her recent labs. She was advised to keep the appointment she has with diabetes education. 5.  Routine health maintenance  Health maintenance was reviewed with the patient. She has received one dose of the [employer] Covid-19 vaccine, and one dose of the Moderna Covid-19 vaccine. She was advised that an additional dose of a Covid vaccine is recommended this . Annual influenza vaccine was recommended. Pneumonia vaccination was recommended and declined. Tdap was recommended and declined. Colonoscopy was recommended. Screening mammogram was recommended. All other health maintenance, including Shingrix, is up to date at this time. Follow up instructions were given to the patient:  Return in about 6 months (around 3/7/2023) for annual wellness visit, hypertension, hyperlipidemia. Subjective:    Belia Gurrola is a 63 y.o. female who presents to [place] [id number] today (9/7/2022) for follow up/evaluation of:  Diabetes (3 month follow up.) and Hyperlipidemia      She states that she has been feeling well. She is tolerating her medications well. Her dose of Rybelsus was increased from 7 mg daily to 14 mg daily at her last office visit. She has no new concerns or complaints today. She has received one dose of the [employer] Covid-19 vaccine, and one dose of the Moderna Covid-19 vaccine. The most recent dose was 12/1/2021.          Immunization history was reviewed:  Immunization History   Administered Date(s) Administered    COVID-19, J&J, (age 18y+), IM, 0.5 mL 03/24/2021    COVID-19, MODERNA BLUE border, Primary or Immunocompromised, (age 12y+), IM, 100 mcg/0.5mL 08/04/2022    COVID-19, MODERNA Booster BLUE border, (age 18y+), IM, 50mcg/0.25mL 12/01/2021    Influenza Vaccine, unspecified formulation 10/14/2010, 10/08/2013, 10/01/2017    Influenza Virus Vaccine 01/01/2012, 10/10/2013, 10/05/2016, 10/01/2017, 10/17/2019, 10/25/2021    Influenza Whole 10/14/2008, 10/13/2009, 01/01/2012, 10/16/2012    Influenza, FLUARIX, FLULAVAL, FLUZONE (age 10 mo+) AND AFLURIA, (age 1 y+), PF, 0.5mL 10/27/2015, 10/05/2016, 10/16/2018, 10/17/2019, 10/25/2021    Influenza, FLUCELVAX, (age 10 mo+), MDCK, PF, 0.5mL 10/19/2020    Influenza, Quadv, 6 mo and older, IM, PF (Flulaval, Fluarix) 10/16/2018    Zoster Recombinant (Shingrix) 05/12/2020, 07/31/2020          She has the following problem list:  Patient Active Problem List   Diagnosis    Acquired hypothyroidism    Vitamin D deficiency    Extreme obesity    Mixed hyperlipidemia    Type 2 diabetes mellitus without complication, without long-term current use of insulin (HCC)    High risk medication use    Essential hypertension        Current medications are:  Outpatient Medications Marked as Taking for the 9/7/22 encounter (Office Visit) with Gilbert Beltran MD   Medication Sig Dispense Refill    RYBELSUS 14 MG TABS take 1 tablet by mouth once daily 30 tablet 0    hydroCHLOROthiazide (HYDRODIURIL) 50 MG tablet take 1 tablet by mouth every morning 90 tablet 0    lisinopril (PRINIVIL;ZESTRIL) 30 MG tablet take 1 tablet by mouth daily 30 tablet 0    ezetimibe (ZETIA) 10 MG tablet Take 1 tablet by mouth daily 90 tablet 1    levothyroxine (EUTHYROX) 88 MCG tablet Take 1 tablet by mouth Daily 90 tablet 3    Blood Pressure KIT Check blood pressure daily prn. 1 kit 0    aspirin 81 MG chewable tablet Take 81 mg by mouth daily      vitamin D (CHOLECALCIFEROL) 1000 UNIT TABS tablet Take 1,000 Units by mouth daily. Calcium Carbonate-Vitamin D (CALCIUM PLUS VITAMIN D PO) Take  by mouth daily. Multiple Vitamins-Minerals (MULTIVITAMIN PO) Take  by mouth daily. She is allergic to atorvastatin, penicillins, and pravastatin. She  reports that she has never smoked. She has never used smokeless tobacco.      Objective:    Vitals:    09/07/22 1200   BP: 126/74   Site: Right Upper Arm   Position: Sitting   Cuff Size: Large Adult   Pulse: 76   Temp: 97.6 °F (36.4 °C)   TempSrc: Temporal   SpO2: 98%   Weight: (!) 310 lb 3.2 oz (140.7 kg)   Height: 5' 3\" (1.6 m)     Body mass index is 54.95 kg/m². Extremely obese female, healthy-appearing, alert, cooperative, and in no acute distress. Neck supple. No adenopathy. Thyroid symmetric, normal size. Chest:  Normal expansion. Clear to auscultation. No rales, rhonchi, or wheezing.   Heart sounds are Final    AST 09/01/2022 12  <32 U/L Final    Total Bilirubin 09/01/2022 0.6  0.3 - 1.2 mg/dL Final    Total Protein 09/01/2022 7.2  6.4 - 8.3 g/dL Final    Albumin 09/01/2022 3.9  3.5 - 5.2 g/dL Final    Albumin/Globulin Ratio 09/01/2022 1.2  1.0 - 2.5 Final    GFR Non- 09/01/2022 39 (A) >60 mL/min Final    GFR  09/01/2022 48 (A) >60 mL/min Final    GFR Comment 09/01/2022        Final    Comment: Average GFR for 61-76 years old:   80 mL/min/1.73sq m  Chronic Kidney Disease:   <60 mL/min/1.73sq m  Kidney failure:   <15 mL/min/1.73sq m              eGFR calculated using average adult body mass.  Additional eGFR calculator available at:        [email or web address]                        (Please note that portions of this note were completed with a voice-recognition program. Efforts were made to edit the dictation but occasionally words are mis-transcribed.)

## 2022-09-12 ENCOUNTER — HOSPITAL ENCOUNTER (OUTPATIENT)
Dept: MAMMOGRAPHY | Age: 62
Discharge: HOME | End: 2022-09-14
Payer: COMMERCIAL

## 2022-09-12 DIAGNOSIS — Z12.31 BREAST CANCER SCREENING BY MAMMOGRAM: ICD-10-CM

## 2022-09-12 PROCEDURE — 77063 BREAST TOMOSYNTHESIS BI: CPT

## 2022-09-14 ENCOUNTER — OFFICE VISIT (OUTPATIENT)
Dept: DIABETES SERVICES | Age: 62
End: 2022-09-14
Payer: COMMERCIAL

## 2022-09-14 VITALS
HEART RATE: 88 BPM | DIASTOLIC BLOOD PRESSURE: 86 MMHG | RESPIRATION RATE: 16 BRPM | BODY MASS INDEX: 51.91 KG/M2 | HEIGHT: 63 IN | SYSTOLIC BLOOD PRESSURE: 118 MMHG | WEIGHT: 293 LBS

## 2022-09-14 DIAGNOSIS — I10 ESSENTIAL HYPERTENSION: ICD-10-CM

## 2022-09-14 DIAGNOSIS — E66.01 CLASS 3 SEVERE OBESITY DUE TO EXCESS CALORIES WITH SERIOUS COMORBIDITY AND BODY MASS INDEX (BMI) OF 50.0 TO 59.9 IN ADULT (HCC): ICD-10-CM

## 2022-09-14 DIAGNOSIS — E78.2 MIXED HYPERLIPIDEMIA: ICD-10-CM

## 2022-09-14 DIAGNOSIS — E11.9 TYPE 2 DIABETES MELLITUS WITHOUT COMPLICATION, WITHOUT LONG-TERM CURRENT USE OF INSULIN (HCC): Primary | ICD-10-CM

## 2022-09-14 PROCEDURE — 3044F HG A1C LEVEL LT 7.0%: CPT | Performed by: NURSE PRACTITIONER

## 2022-09-14 PROCEDURE — 99214 OFFICE O/P EST MOD 30 MIN: CPT | Performed by: NURSE PRACTITIONER

## 2022-09-14 RX ORDER — ORAL SEMAGLUTIDE 14 MG/1
TABLET ORAL
Qty: 90 TABLET | Refills: 1 | Status: SHIPPED | OUTPATIENT
Start: 2022-09-14

## 2022-09-14 ASSESSMENT — ENCOUNTER SYMPTOMS
ABDOMINAL PAIN: 0
DIARRHEA: 0
SHORTNESS OF BREATH: 0
RESPIRATORY NEGATIVE: 1

## 2022-09-14 NOTE — PROGRESS NOTES
[address], [address]  [facility] [id number] [phone]        HISTORY:    Krupa Benjamin presents today for evaluation and management of:  Chief Complaint   Patient presents with    Diabetes    3 Month Follow-Up         Interval History:     Past DM Medications   none     Current Diabetic Medications  rybelsus 14 mg     DKA episodes: 0    06/08/22   Krupa Benjamin is a 63 y.o. female patient who presents to clinic today for her diabetes. she has a history of HTN, hyperlipidemia and obesity  which contributes to her diabetes. At previous visit diabetes counseling was provided. she denies any current signs or symptoms of hyper/hypoglycemia. she states they are taking their medications as prescribed without any adverse effects. She was recently seen by pcp and rybelsus was increased   Diet: documenting food intake,  Exercise: walking a few times a week   BS testing: none   Issues:   Diabetic foot exam up-to-date: Yes  Diabetic retinal exam up-to-date: No has appt this month   Hypoglycemia as needed treatment: snack     09/14/22   Krupa Benjamin is a 63 y.o. female patient who presents to clinic today for her diabetes. she has a history of HTN, hyperlipidemia and obesity which contributes to her diabetes. At previous visit diabetes counseling was provided. she denies any current signs or symptoms of hyper/hypoglycemia. she states they are taking their medications as prescribed without any adverse effects. Diet:  [facility] meetings   Exercise: walking goal is 30 min 5 days a week   BS testing: non   Issues: none   Diabetic foot exam up-to-date: Yes  Diabetic retinal exam up-to-date: Yes  Hypoglycemia as needed treatment: snack     High cholesterol-  Takes zetia and denies any adverse effects with its use. Watches diet and exercise. Hypertension-  Takes HCTZ, lisinopril and denies any adverse effects with their use. Watches diet and exercise. Denies any chest pain, dizziness or edema. Obesity- Working on weight loss. Down 40 lbs in the past 5 months       Past Medical History:   Diagnosis Date    Dyslipidemia     Elevated blood pressure     Extreme obesity     Hypothyroidism     IFG (impaired fasting glucose) 8/11/2016    Lower extremity edema     Vitamin D deficiency      Family History   Problem Relation Age of Onset    High Cholesterol Father     High Blood Pressure Father     Heart Disease Father     High Blood Pressure Brother     High Cholesterol Brother     Diabetes Brother     Coronary Art Dis Paternal Grandmother     Breast Cancer Maternal Aunt     No Known Problems Paternal Aunt     No Known Problems Paternal Aunt     No Known Problems Paternal Aunt      Social History     Tobacco Use    Smoking status: Never    Smokeless tobacco: Never   Substance Use Topics    Alcohol use: Yes     Comment: I have an occasional glass of wine or beer, every 2-3 months    Drug use: No     Allergies   Allergen Reactions    Atorvastatin      back pain    Penicillins Hives    Pravastatin Other (See Comments)     Made her back and legs hurt,affected her mood       MEDICATIONS:  Current Outpatient Medications   Medication Sig Dispense Refill    Semaglutide (RYBELSUS) 14 MG TABS take 1 tablet by mouth once daily 90 tablet 1    lisinopril (PRINIVIL;ZESTRIL) 30 MG tablet take 1 tablet by mouth daily 90 tablet 0    levothyroxine (EUTHYROX) 88 MCG tablet Take 1 tablet by mouth Daily 90 tablet 3    hydroCHLOROthiazide (HYDRODIURIL) 50 MG tablet take 1 tablet by mouth every morning 90 tablet 0    ezetimibe (ZETIA) 10 MG tablet Take 1 tablet by mouth daily 90 tablet 1    aspirin 81 MG chewable tablet Take 81 mg by mouth daily      vitamin D (CHOLECALCIFEROL) 1000 UNIT TABS tablet Take 1,000 Units by mouth daily. Calcium Carbonate-Vitamin D (CALCIUM PLUS VITAMIN D PO) Take  by mouth daily.       Multiple Vitamins-Minerals (MULTIVITAMIN PO) Take  by mouth daily. Blood Pressure KIT Check blood pressure daily prn. 1 kit 0     No current facility-administered medications for this visit. Review ofSymptoms:  Review of Systems   Constitutional:  Positive for fatigue. Negative for unexpected weight change. Eyes:  Negative for visual disturbance. Respiratory: Negative. Negative for shortness of breath. Cardiovascular:  Negative for chest pain and leg swelling. Gastrointestinal:  Negative for abdominal pain and diarrhea. Endocrine: Negative for polydipsia, polyphagia and polyuria. Genitourinary: Negative. Musculoskeletal: Negative. Skin:  Negative for rash and wound. Neurological:  Negative for dizziness, tremors, seizures and headaches. Psychiatric/Behavioral: Negative. Negative for confusion and decreased concentration. Theremainder of a complete 14-point review of systems is negative. Vital Signs: /86 (Site: Right Upper Arm, Position: Sitting, Cuff Size: Large Adult)   Pulse 88   Resp 16   Ht 5' 3\" (1.6 m)   Wt (!) 310 lb (140.6 kg)   LMP 01/01/2011   BMI 54.91 kg/m²      Wt Readings from Last 3 Encounters:   09/14/22 (!) 310 lb (140.6 kg)   09/07/22 (!) 310 lb 3.2 oz (140.7 kg)   06/08/22 (!) 324 lb (147 kg)     Body mass index is 54.91 kg/m².   LABS:  Hemoglobin A1C   Date Value Ref Range Status   09/01/2022 5.5 4.0 - 6.0 % Final   06/03/2022 6.1 (H) 4.0 - 6.0 % Final     Lab Results   Component Value Date    LABMICR Can not be calculated 06/03/2022     Lab Results   Component Value Date     09/01/2022    K 4.4 09/01/2022     09/01/2022    CO2 30 09/01/2022    BUN 24 (H) 09/01/2022    CREATININE 1.37 (H) 09/01/2022    GLUCOSE 108 (H) 09/01/2022    CALCIUM 9.7 09/01/2022    PROT 7.2 09/01/2022    LABALBU 3.9 09/01/2022    BILITOT 0.6 09/01/2022    ALKPHOS 85 09/01/2022    AST 12 09/01/2022    ALT 12 09/01/2022    LABGLOM 39 (L) 09/01/2022    GFRAA 48 (L) 09/01/2022     Lab Results Component Value Date    CHOL 172 09/01/2022    CHOL 184 06/03/2022    CHOL 198 08/12/2020     Lab Results   Component Value Date    TRIG 130 09/01/2022    TRIG 169 (H) 06/03/2022    TRIG 120 08/12/2020     Lab Results   Component Value Date    HDL 39 (L) 09/01/2022    HDL 42 06/03/2022    HDL 44 02/28/2022     Lab Results   Component Value Date    LDLCHOLESTEROL 107 09/01/2022    LDLCHOLESTEROL 108 06/03/2022    LDLCHOLESTEROL 101 02/28/2022     Lab Results   Component Value Date    VLDL NOT REPORTED (H) 02/19/2021    VLDL NOT REPORTED 08/12/2020    VLDL NOT REPORTED 02/25/2020     Lab Results   Component Value Date    CHOLHDLRATIO 4.4 09/01/2022    CHOLHDLRATIO 4.4 06/03/2022    CHOLHDLRATIO 4.1 02/28/2022           Physical Exam  Constitutional:       Appearance: She is well-developed. Eyes:      Pupils: Pupils are equal, round, and reactive to light. Neck:      Thyroid: No thyroid mass, thyromegaly or thyroid tenderness. Cardiovascular:      Rate and Rhythm: Normal rate and regular rhythm. Heart sounds: Normal heart sounds. Pulmonary:      Effort: Pulmonary effort is normal.      Breath sounds: Normal breath sounds. Abdominal:      General: Bowel sounds are normal.      Palpations: Abdomen is soft. Skin:     General: Skin is warm and dry. Comments: Negative for open/nonhealing wounds. Negative for lipohypertrophy. Neurological:      Mental Status: She is alert and oriented to person, place, and time. ASSESSMENT/PLAN:     Diagnosis Orders   1. Type 2 diabetes mellitus without complication, without long-term current use of insulin (HCC)  Semaglutide (RYBELSUS) 14 MG TABS    Hemoglobin A1C      2. Mixed hyperlipidemia        3. Essential hypertension        4.  Class 3 severe obesity due to excess calories with serious comorbidity and body mass index (BMI) of 50.0 to 59.9 in adult [facility])          Orders Placed This Encounter   Procedures    Hemoglobin A1C     Orders Placed This Encounter   Medications    Semaglutide (RYBELSUS) 14 MG TABS     Sig: take 1 tablet by mouth once daily     Dispense:  90 tablet     Refill:  1     Requested Prescriptions     Signed Prescriptions Disp Refills    Semaglutide (RYBELSUS) 14 MG TABS 90 tablet 1     Sig: take 1 tablet by mouth once daily       1. Type 2 diabetes mellitus without complication, without long-term current use of insulin (HCC)  - Stable  HbA1C goal is less than 7%. - Fasting blood glucose goal is 70-120mg/dl and postprandial blood sugar goal is less than 180 mg/dl. - Labs reviewed including most recent A1c, microalbumin and kidney function. Repeat labs due in 3 months.    -We discussed in great detail dietary modifications they can make to better improve their blood sugars. -follow up diabetes education completed, all questions answered. -doing well will do a 3 month follow up then a 6 month if she remains stable. Discussed signs and symptoms of hyper/hypoglycemia and how to treat. Encouraged 150 minutes of physical activity per week. Follow a low carbohydrate diet. Encouraged at least 7 hours of sleep. The patient was informed of the goals of diabetes management. This can only be accomplished by watching their diet and exercise levels. We certainly use medicines to help attain these goals. The consequences of not controlling blood sugars were discussed. These include blindness, heart disease, stroke, kidney disease, and possibly need for dialysis. They were told to be careful with their foot care as diabetics often have nerve damage, infections and risk for limb amutations . They also need a dilated eye exam yearly. We discussed the issues of diet, exercise, medication, complication avoidance, reviewed the signs and symptoms of diabetes, hypoglycemic episodes, significance of HbA1C.     - Semaglutide (RYBELSUS) 14 MG TABS; take 1 tablet by mouth once daily  Dispense: 90 tablet; Refill: 1  - Hemoglobin A1C; Future    2.  Mixed hyperlipidemia  stable, lipid panel reviewed, continue current medications. Diet and exercise      3. Essential hypertension   stable, continue current medications. Diet and exercise Seek emergent care if chest pain develops. 4. Class 3 severe obesity due to excess calories with serious comorbidity and body mass index (BMI) of 50.0 to 59.9 in adult [facility])  Reduce calories and increase physical activity to achieve a slow and steady weight loss to improve blood pressure, cholesterol and diabetes. Answered all patient questions. Agrees to follow plan of care and to follow up in 3 months, sooner if needed. Call office if unexplained blood sugars less than 70 occur or above 400. Call office or access [employer] with any further questions or concerns. Be sure to bring glucometer/food log at next appointment. Total time spent reviewing chart, labs, counseling patient and documenting on the date of the encounter: 30 min.       Electronically signed by Kaye Siemens, APRN - CNP on 9/14/2022 at 11:24 AM      (Please note that portions of this note were completed with a voice-recognition program. Efforts were made to edit the dictation but occasionally words are mis-transcribed.)

## 2022-11-29 DIAGNOSIS — R60.0 BILATERAL LOWER EXTREMITY EDEMA: ICD-10-CM

## 2022-11-29 DIAGNOSIS — I10 ESSENTIAL HYPERTENSION: ICD-10-CM

## 2022-11-29 RX ORDER — HYDROCHLOROTHIAZIDE 50 MG/1
50 TABLET ORAL EVERY MORNING
Qty: 90 TABLET | Refills: 1 | Status: SHIPPED | OUTPATIENT
Start: 2022-11-29

## 2022-12-15 ENCOUNTER — HOSPITAL ENCOUNTER (OUTPATIENT)
Age: 62
Discharge: HOME | End: 2022-12-15
Payer: COMMERCIAL

## 2022-12-15 DIAGNOSIS — E11.9 TYPE 2 DIABETES MELLITUS WITHOUT COMPLICATION, WITHOUT LONG-TERM CURRENT USE OF INSULIN (HCC): ICD-10-CM

## 2022-12-15 LAB
ESTIMATED AVERAGE GLUCOSE: 105 MG/DL
HBA1C MFR BLD: 5.3 % (ref 4–6)

## 2022-12-15 PROCEDURE — 36415 COLL VENOUS BLD VENIPUNCTURE: CPT

## 2022-12-15 PROCEDURE — 83036 HEMOGLOBIN GLYCOSYLATED A1C: CPT

## 2022-12-18 DIAGNOSIS — E78.2 MIXED HYPERLIPIDEMIA: ICD-10-CM

## 2022-12-19 ENCOUNTER — OFFICE VISIT (OUTPATIENT)
Dept: DIABETES SERVICES | Age: 62
End: 2022-12-19
Payer: COMMERCIAL

## 2022-12-19 VITALS
WEIGHT: 293 LBS | DIASTOLIC BLOOD PRESSURE: 82 MMHG | HEIGHT: 63 IN | BODY MASS INDEX: 51.91 KG/M2 | RESPIRATION RATE: 16 BRPM | SYSTOLIC BLOOD PRESSURE: 112 MMHG | HEART RATE: 72 BPM

## 2022-12-19 DIAGNOSIS — E78.2 MIXED HYPERLIPIDEMIA: ICD-10-CM

## 2022-12-19 DIAGNOSIS — I10 ESSENTIAL HYPERTENSION: ICD-10-CM

## 2022-12-19 DIAGNOSIS — E11.9 TYPE 2 DIABETES MELLITUS WITHOUT COMPLICATION, WITHOUT LONG-TERM CURRENT USE OF INSULIN (HCC): Primary | ICD-10-CM

## 2022-12-19 PROCEDURE — 99214 OFFICE O/P EST MOD 30 MIN: CPT | Performed by: NURSE PRACTITIONER

## 2022-12-19 PROCEDURE — 3074F SYST BP LT 130 MM HG: CPT | Performed by: NURSE PRACTITIONER

## 2022-12-19 PROCEDURE — 3078F DIAST BP <80 MM HG: CPT | Performed by: NURSE PRACTITIONER

## 2022-12-19 PROCEDURE — 3044F HG A1C LEVEL LT 7.0%: CPT | Performed by: NURSE PRACTITIONER

## 2022-12-19 RX ORDER — ORAL SEMAGLUTIDE 14 MG/1
TABLET ORAL
Qty: 90 TABLET | Refills: 1 | Status: SHIPPED | OUTPATIENT
Start: 2022-12-19

## 2022-12-19 ASSESSMENT — ENCOUNTER SYMPTOMS
RESPIRATORY NEGATIVE: 1
ABDOMINAL PAIN: 0
SHORTNESS OF BREATH: 0
DIARRHEA: 0

## 2022-12-19 NOTE — TELEPHONE ENCOUNTER
Scar Longoria called requesting a refill of the below medication which has been pended for you:     Requested Prescriptions     Pending Prescriptions Disp Refills    ezetimibe (ZETIA) 10 MG tablet [Pharmacy Med Name: EZETIMIBE 10 MG TABLET] 90 tablet 1     Sig: take 1 tablet by mouth once daily       Last Appointment Date: 9/7/2022  Next Appointment Date: 3/17/2023    Allergies   Allergen Reactions    Atorvastatin      back pain    Penicillins Hives    Pravastatin Other (See Comments)     Made her back and legs hurt,affected her mood

## 2022-12-19 NOTE — PROGRESS NOTES
[address]  [address] [facility], [place], [address]  [facility] [address] [phone]        HISTORY:    Roselia Ivy presents today for evaluation and management of:  Chief Complaint   Patient presents with    3 Month Follow-Up    Diabetes       Patient Care Team:  Jesus Eaton MD as PCP - General (Family Medicine)  Jesus Eaton MD as PCP - [facility]      Interval History:    Past DM Medications   none     Current Diabetic Medications  rybelsus 14 mg     DKA episodes: 0    09/14/22   Roselia Ivy is a 63 y.o. female patient who presents to clinic today for her diabetes. she has a history of HTN, hyperlipidemia and obesity which contributes to her diabetes. At previous visit diabetes counseling was provided. she denies any current signs or symptoms of hyper/hypoglycemia. she states they are taking their medications as prescribed without any adverse effects. Diet:  [employer] meetings   Exercise: walking goal is 30 min 5 days a week   BS testing: non   Issues: none   Diabetic foot exam up-to-date: Yes  Diabetic retinal exam up-to-date: Yes  Hypoglycemia as needed treatment: snack     12/19/22   Roselia Ivy is a 63 y.o. female patient who presents to clinic today for her diabetes. she has a history of HTN, hyperlipidemia and obesity which contributes to her diabetes. At previous visit diabetes counseling was provided. she denies any current signs or symptoms of hyper/hypoglycemia. she states they are taking their medications as prescribed without any adverse effects. Diet: [employer]  Exercise: walking  BS testing:  none  Hypoglycemia: No  Hypoglycemia as needed treatment: snack   Issues: denies   Diabetic foot exam up-to-date: Yes  Diabetic retinal exam up-to-date: Yes    Diabetes complications:none    High cholesterol-  Takes zetia and denies any adverse effects with its use. Watches diet and exercise.       Hypertension-  Takes Take  by mouth daily. Multiple Vitamins-Minerals (MULTIVITAMIN PO) Take  by mouth daily. Blood Pressure KIT Check blood pressure daily prn. 1 kit 0     No current facility-administered medications for this visit. Review ofSymptoms:  Review of Systems   Constitutional:  Positive for fatigue. Negative for unexpected weight change. Eyes:  Negative for visual disturbance. Respiratory: Negative. Negative for shortness of breath. Cardiovascular:  Negative for chest pain and leg swelling. Gastrointestinal:  Negative for abdominal pain and diarrhea. Endocrine: Negative for polydipsia, polyphagia and polyuria. Genitourinary: Negative. Musculoskeletal: Negative. Skin:  Negative for rash and wound. Neurological:  Negative for dizziness, tremors, seizures and headaches. Psychiatric/Behavioral: Negative. Negative for confusion and decreased concentration. Theremainder of a complete 14-point review of systems is negative. Vital Signs: /82 (Site: Left Upper Arm, Position: Sitting, Cuff Size: Large Adult)   Pulse 72   Resp 16   Ht 5' 3\" (1.6 m)   Wt 299 lb (135.6 kg)   LMP 01/01/2011   BMI 52.97 kg/m²      Wt Readings from Last 3 Encounters:   12/19/22 299 lb (135.6 kg)   09/14/22 (!) 310 lb (140.6 kg)   09/07/22 (!) 310 lb 3.2 oz (140.7 kg)     Body mass index is 52.97 kg/m².   LABS:  Hemoglobin A1C   Date Value Ref Range Status   12/15/2022 5.3 4.0 - 6.0 % Final   09/01/2022 5.5 4.0 - 6.0 % Final     Lab Results   Component Value Date    LABMICR Can not be calculated 06/03/2022     Lab Results   Component Value Date     09/01/2022    K 4.4 09/01/2022     09/01/2022    CO2 30 09/01/2022    BUN 24 (H) 09/01/2022    CREATININE 1.37 (H) 09/01/2022    GLUCOSE 108 (H) 09/01/2022    CALCIUM 9.7 09/01/2022    PROT 7.2 09/01/2022    LABALBU 3.9 09/01/2022    BILITOT 0.6 09/01/2022    ALKPHOS 85 09/01/2022    AST 12 09/01/2022    ALT 12 09/01/2022    LABGLOM 39 (L) 09/01/2022    GFRAA 48 (L) 09/01/2022     Lab Results   Component Value Date    CHOL 172 09/01/2022    CHOL 184 06/03/2022    CHOL 198 08/12/2020     Lab Results   Component Value Date    TRIG 130 09/01/2022    TRIG 169 (H) 06/03/2022    TRIG 120 08/12/2020     Lab Results   Component Value Date    HDL 39 (L) 09/01/2022    HDL 42 06/03/2022    HDL 44 02/28/2022     Lab Results   Component Value Date    LDLCHOLESTEROL 107 09/01/2022    LDLCHOLESTEROL 108 06/03/2022    LDLCHOLESTEROL 101 02/28/2022     Lab Results   Component Value Date    VLDL NOT REPORTED (H) 02/19/2021    VLDL NOT REPORTED 08/12/2020    VLDL NOT REPORTED 02/25/2020     Lab Results   Component Value Date    CHOLHDLRATIO 4.4 09/01/2022    CHOLHDLRATIO 4.4 06/03/2022    CHOLHDLRATIO 4.1 02/28/2022           Physical Exam  Constitutional:       Appearance: She is well-developed. Eyes:      Pupils: Pupils are equal, round, and reactive to light. Neck:      Thyroid: No thyroid mass, thyromegaly or thyroid tenderness. Cardiovascular:      Rate and Rhythm: Normal rate and regular rhythm. Heart sounds: Normal heart sounds. Pulmonary:      Effort: Pulmonary effort is normal.      Breath sounds: Normal breath sounds. Abdominal:      General: Bowel sounds are normal.      Palpations: Abdomen is soft. Skin:     General: Skin is warm and dry. Comments: Negative for open/nonhealing wounds. Negative for lipohypertrophy. Neurological:      Mental Status: She is alert and oriented to person, place, and time. ASSESSMENT/PLAN:     Diagnosis Orders   1. Type 2 diabetes mellitus without complication, without long-term current use of insulin (HCC)  Semaglutide (RYBELSUS) 14 MG TABS    Hemoglobin A1C      2. Mixed hyperlipidemia        3.  Essential hypertension          Orders Placed This Encounter   Procedures    Hemoglobin A1C     Orders Placed This Encounter   Medications    Semaglutide (RYBELSUS) 14 MG TABS     Sig: take 1 tablet by mouth once daily     Dispense:  90 tablet     Refill:  1     Requested Prescriptions     Signed Prescriptions Disp Refills    Semaglutide (RYBELSUS) 14 MG TABS 90 tablet 1     Sig: take 1 tablet by mouth once daily       1. Type 2 diabetes mellitus without complication, without long-term current use of insulin (HCC)  - Stable  HbA1C goal is less than 7%. - Fasting blood glucose goal is 70-120mg/dl and postprandial blood sugar goal is less than 180 mg/dl. - Labs reviewed including most recent A1c, UACR and kidney function. Repeat labs due in 3 months.    -We discussed in great detail dietary modifications they can make to better improve their blood sugars. -follow up diabetes education completed, all questions answered. -doing well no changes. Discussed signs and symptoms of hyper/hypoglycemia and how to treat. Encouraged 150 minutes of physical activity per week. Follow a low carbohydrate diet. Encouraged at least 7 hours of sleep. The patient was informed of the goals of diabetes management. This can only be accomplished by watching their diet and exercise levels. We certainly use medicines to help attain these goals. The consequences of not controlling blood sugars were discussed. These include blindness, heart disease, stroke, kidney disease, and possibly need for dialysis. They were told to be careful with their foot care as diabetics often have nerve damage, infections and risk for limb amutations . They also need a dilated eye exam yearly. We discussed the issues of diet, exercise, medication, complication avoidance, reviewed the signs and symptoms of diabetes, hypoglycemic episodes, significance of HbA1C.     - Semaglutide (RYBELSUS) 14 MG TABS; take 1 tablet by mouth once daily  Dispense: 90 tablet; Refill: 1  - Hemoglobin A1C; Future    2. Mixed hyperlipidemia  stable, lipid panel reviewed, continue current medications. Diet and exercise.        3. Essential hypertension   stable, continue current medications. Diet and exercise Seek emergent care if chest pain develops. Answered all patient questions. Agrees to follow plan of care and to follow up in 6 months, sooner if needed. Call office if unexplained blood sugars less than 70 occur or above 400. Call office or access MyChart with any further questions or concerns. Be sure to bring glucometer/food log at next appointment. Total time spent reviewing chart, labs, counseling patient and documenting on the date of the encounter: 30 min.       Electronically signed by ARISTEO Gibbs CNP on 12/19/2022 at 11:08 AM      (Please note that portions of this note were completed with a voice-recognition program. Efforts were made to edit the dictation but occasionally words are mis-transcribed.)

## 2022-12-20 RX ORDER — EZETIMIBE 10 MG/1
TABLET ORAL
Qty: 90 TABLET | Refills: 0 | Status: SHIPPED | OUTPATIENT
Start: 2022-12-20

## 2023-01-01 DIAGNOSIS — I10 ESSENTIAL HYPERTENSION: ICD-10-CM

## 2023-01-03 RX ORDER — LISINOPRIL 30 MG/1
TABLET ORAL
Qty: 90 TABLET | Refills: 0 | Status: SHIPPED | OUTPATIENT
Start: 2023-01-03

## 2023-01-03 NOTE — TELEPHONE ENCOUNTER
Stephanie Fitzgerald called requesting a refill of the below medication which has been pended for you:     Requested Prescriptions     Pending Prescriptions Disp Refills    lisinopril (PRINIVIL;ZESTRIL) 30 MG tablet [Pharmacy Med Name: LISINOPRIL 30 MG TABLET] 90 tablet 0     Sig: take 1 tablet by mouth once daily       Last Appointment Date: 9/7/2022  Next Appointment Date: 3/17/2023    Allergies   Allergen Reactions    Atorvastatin      back pain    Penicillins Hives    Pravastatin Other (See Comments)     Made her back and legs hurt,affected her mood

## 2023-03-16 ENCOUNTER — HOSPITAL ENCOUNTER (OUTPATIENT)
Age: 63
Discharge: HOME | End: 2023-03-16
Payer: COMMERCIAL

## 2023-03-16 DIAGNOSIS — E11.9 TYPE 2 DIABETES MELLITUS WITHOUT COMPLICATION, WITHOUT LONG-TERM CURRENT USE OF INSULIN (HCC): ICD-10-CM

## 2023-03-16 DIAGNOSIS — E78.2 MIXED HYPERLIPIDEMIA: ICD-10-CM

## 2023-03-16 LAB
ALBUMIN SERPL-MCNC: 4 G/DL (ref 3.5–5.2)
ALBUMIN/GLOBULIN RATIO: 1.3 (ref 1–2.5)
ALP SERPL-CCNC: 103 U/L (ref 35–104)
ALT SERPL-CCNC: 16 U/L (ref 5–33)
ANION GAP SERPL CALCULATED.3IONS-SCNC: 9 MMOL/L (ref 9–17)
AST SERPL-CCNC: 14 U/L
BILIRUB SERPL-MCNC: 0.7 MG/DL (ref 0.3–1.2)
BUN SERPL-MCNC: 24 MG/DL (ref 8–23)
BUN/CREAT BLD: 20 (ref 9–20)
CALCIUM SERPL-MCNC: 9.9 MG/DL (ref 8.6–10.4)
CHLORIDE SERPL-SCNC: 101 MMOL/L (ref 98–107)
CHOLEST SERPL-MCNC: 185 MG/DL
CHOLESTEROL/HDL RATIO: 4.2
CO2 SERPL-SCNC: 29 MMOL/L (ref 20–31)
CREAT SERPL-MCNC: 1.19 MG/DL (ref 0.5–0.9)
EST. AVERAGE GLUCOSE BLD GHB EST-MCNC: 105 MG/DL
GFR SERPL CREATININE-BSD FRML MDRD: 52 ML/MIN/1.73M2
GLUCOSE SERPL-MCNC: 102 MG/DL (ref 70–99)
HBA1C MFR BLD: 5.3 % (ref 4–6)
HDLC SERPL-MCNC: 44 MG/DL
LDLC SERPL CALC-MCNC: 115 MG/DL (ref 0–130)
POTASSIUM SERPL-SCNC: 4.7 MMOL/L (ref 3.7–5.3)
PROT SERPL-MCNC: 7 G/DL (ref 6.4–8.3)
SODIUM SERPL-SCNC: 139 MMOL/L (ref 135–144)
TRIGL SERPL-MCNC: 129 MG/DL

## 2023-03-16 PROCEDURE — 80053 COMPREHEN METABOLIC PANEL: CPT

## 2023-03-16 PROCEDURE — 83036 HEMOGLOBIN GLYCOSYLATED A1C: CPT

## 2023-03-16 PROCEDURE — 80061 LIPID PANEL: CPT

## 2023-03-16 PROCEDURE — 36415 COLL VENOUS BLD VENIPUNCTURE: CPT

## 2023-03-21 ENCOUNTER — OFFICE VISIT (OUTPATIENT)
Dept: FAMILY MEDICINE CLINIC | Age: 63
End: 2023-03-21

## 2023-03-21 VITALS
SYSTOLIC BLOOD PRESSURE: 112 MMHG | OXYGEN SATURATION: 95 % | DIASTOLIC BLOOD PRESSURE: 86 MMHG | HEART RATE: 100 BPM | WEIGHT: 293 LBS | TEMPERATURE: 97.9 F | HEIGHT: 63 IN | RESPIRATION RATE: 18 BRPM | BODY MASS INDEX: 51.91 KG/M2

## 2023-03-21 DIAGNOSIS — E78.2 MIXED HYPERLIPIDEMIA: ICD-10-CM

## 2023-03-21 DIAGNOSIS — E66.01 SEVERE OBESITY (BMI >= 40) (HCC): ICD-10-CM

## 2023-03-21 DIAGNOSIS — R60.0 BILATERAL LOWER EXTREMITY EDEMA: ICD-10-CM

## 2023-03-21 DIAGNOSIS — Z12.11 SCREENING FOR COLON CANCER: ICD-10-CM

## 2023-03-21 DIAGNOSIS — Z00.00 ANNUAL VISIT FOR GENERAL ADULT MEDICAL EXAMINATION WITHOUT ABNORMAL FINDINGS: Primary | ICD-10-CM

## 2023-03-21 DIAGNOSIS — I10 ESSENTIAL HYPERTENSION: ICD-10-CM

## 2023-03-21 DIAGNOSIS — E03.9 ACQUIRED HYPOTHYROIDISM: ICD-10-CM

## 2023-03-21 DIAGNOSIS — E11.9 TYPE 2 DIABETES MELLITUS WITHOUT COMPLICATION, WITHOUT LONG-TERM CURRENT USE OF INSULIN (HCC): ICD-10-CM

## 2023-03-21 DIAGNOSIS — N18.31 STAGE 3A CHRONIC KIDNEY DISEASE (HCC): ICD-10-CM

## 2023-03-21 RX ORDER — HYDROCHLOROTHIAZIDE 50 MG/1
50 TABLET ORAL EVERY MORNING
Qty: 90 TABLET | Refills: 1 | Status: SHIPPED | OUTPATIENT
Start: 2023-03-21

## 2023-03-21 RX ORDER — ORAL SEMAGLUTIDE 14 MG/1
TABLET ORAL
Qty: 90 TABLET | Refills: 1 | Status: SHIPPED | OUTPATIENT
Start: 2023-03-21

## 2023-03-21 RX ORDER — LEVOTHYROXINE SODIUM 88 UG/1
88 TABLET ORAL DAILY
Qty: 90 TABLET | Refills: 1 | Status: SHIPPED | OUTPATIENT
Start: 2023-03-21 | End: 2023-06-19

## 2023-03-21 RX ORDER — LISINOPRIL 30 MG/1
TABLET ORAL
Qty: 90 TABLET | Refills: 1 | Status: SHIPPED | OUTPATIENT
Start: 2023-03-21

## 2023-03-21 RX ORDER — EZETIMIBE 10 MG/1
10 TABLET ORAL DAILY
Qty: 90 TABLET | Refills: 1 | Status: SHIPPED | OUTPATIENT
Start: 2023-03-21

## 2023-03-21 SDOH — ECONOMIC STABILITY: FOOD INSECURITY: WITHIN THE PAST 12 MONTHS, YOU WORRIED THAT YOUR FOOD WOULD RUN OUT BEFORE YOU GOT MONEY TO BUY MORE.: NEVER TRUE

## 2023-03-21 SDOH — ECONOMIC STABILITY: INCOME INSECURITY: HOW HARD IS IT FOR YOU TO PAY FOR THE VERY BASICS LIKE FOOD, HOUSING, MEDICAL CARE, AND HEATING?: NOT HARD AT ALL

## 2023-03-21 SDOH — ECONOMIC STABILITY: FOOD INSECURITY: WITHIN THE PAST 12 MONTHS, THE FOOD YOU BOUGHT JUST DIDN'T LAST AND YOU DIDN'T HAVE MONEY TO GET MORE.: NEVER TRUE

## 2023-03-21 SDOH — ECONOMIC STABILITY: HOUSING INSECURITY
IN THE LAST 12 MONTHS, WAS THERE A TIME WHEN YOU DID NOT HAVE A STEADY PLACE TO SLEEP OR SLEPT IN A SHELTER (INCLUDING NOW)?: NO

## 2023-03-21 ASSESSMENT — PATIENT HEALTH QUESTIONNAIRE - PHQ9
SUM OF ALL RESPONSES TO PHQ9 QUESTIONS 1 & 2: 0
SUM OF ALL RESPONSES TO PHQ QUESTIONS 1-9: 0
1. LITTLE INTEREST OR PLEASURE IN DOING THINGS: 0
2. FEELING DOWN, DEPRESSED OR HOPELESS: 0

## 2023-03-21 NOTE — PROGRESS NOTES
Patient dose not want the Pneumococcal vaccine, the Tdap vaccine or the Colorectal Cancer Screen today.
Patient will wait till next appointment to do Diabetic foot exam.
are:  Outpatient Medications Marked as Taking for the 3/21/23 encounter (Office Visit) with Anita Kelly MD   Medication Sig Dispense Refill    lisinopril (PRINIVIL;ZESTRIL) 30 MG tablet take 1 tablet by mouth once daily 90 tablet 0    ezetimibe (ZETIA) 10 MG tablet take 1 tablet by mouth once daily 90 tablet 0    Semaglutide (RYBELSUS) 14 MG TABS take 1 tablet by mouth once daily 90 tablet 1    hydroCHLOROthiazide (HYDRODIURIL) 50 MG tablet take 1 tablet by mouth every morning 90 tablet 1    levothyroxine (EUTHYROX) 88 MCG tablet Take 1 tablet by mouth Daily 90 tablet 3    Blood Pressure KIT Check blood pressure daily prn. 1 kit 0    aspirin 81 MG chewable tablet Take 81 mg by mouth daily      vitamin D (CHOLECALCIFEROL) 1000 UNIT TABS tablet Take 1,000 Units by mouth daily. Calcium Carbonate-Vitamin D (CALCIUM PLUS VITAMIN D PO) Take  by mouth daily. Multiple Vitamins-Minerals (MULTIVITAMIN PO) Take  by mouth daily. She is allergic to atorvastatin, penicillins, and pravastatin. She  reports that she has never smoked. She has never used smokeless tobacco.      Objective:    Vitals:    03/21/23 1637   BP: 112/86   Site: Right Upper Arm   Position: Sitting   Cuff Size: Large Adult   Pulse: 100   Resp: 18   Temp: 97.9 °F (36.6 °C)   TempSrc: Tympanic   SpO2: 95%   Weight: (!) 301 lb 9.6 oz (136.8 kg)   Height: 5' 3\" (1.6 m)     Body mass index is 53.43 kg/m². Well-nourished, well-developed female with severe obesity, healthy-appearing, alert, cooperative, and in no acute distress. Neck supple. No adenopathy. Thyroid symmetric, normal size. Chest:  Normal expansion. Clear to auscultation. No rales, rhonchi, or wheezing. Heart sounds are normal.  Regular rate and rhythm without murmur, gallop or rub. Lower extremities have no edema.     Results of labs done 3/16/2023 were reviewed with the patient:   Hospital Outpatient Visit on 03/16/2023   Component Date Value Ref Range Status

## 2023-04-22 LAB — NONINV COLON CA DNA+OCC BLD SCRN STL QL: POSITIVE

## 2023-04-25 ENCOUNTER — TELEPHONE (OUTPATIENT)
Dept: SURGERY | Age: 63
End: 2023-04-25

## 2023-04-25 ENCOUNTER — TELEPHONE (OUTPATIENT)
Dept: FAMILY MEDICINE CLINIC | Age: 63
End: 2023-04-25

## 2023-04-25 DIAGNOSIS — R19.5 POSITIVE COLORECTAL CANCER SCREENING USING COLOGUARD TEST: Primary | ICD-10-CM

## 2023-04-25 NOTE — TELEPHONE ENCOUNTER
----- Message from Lakseha Abdi MD sent at 4/25/2023  1:19 PM EDT -----  Please call the patient with the results. She has not viewed the My Chart message.

## 2023-04-25 NOTE — TELEPHONE ENCOUNTER
Patient notified and verbalized understanding. Patient agreeable to general surgery referral. Referral placed and patient call transferred to schedule.

## 2023-04-25 NOTE — TELEPHONE ENCOUNTER
Dr Kristine Nolan colonoscopy paperwork sent. Positive colagard, no symptoms. Patient wants colonoscopy at [facility].

## 2023-05-09 ENCOUNTER — TELEPHONE (OUTPATIENT)
Dept: SURGERY | Age: 63
End: 2023-05-09

## 2023-05-09 NOTE — TELEPHONE ENCOUNTER
[address]            FKT:6/16/6704           Surgical/Procedure Planned: Colonoscopy    Date & Location: 5/24/2023 at [facility]       Outpatient   Planned Length of OR: 30 min    Sedation: general    Estimated Cardiac Risk for Non-Cardiac Surgery/Procedure     Low______ Moderate______ High______    Medication Instructions - Clarification needed by this date:   -Diabetic medications:    Semaglutide Hold ___ Days    -Other medications:    ASA 81 mg Hold ___ Days      Resume medications:     Lovenox indicated: _____Yes _____NO    Provider:Dr. Sloan Tejada of Provider Giving Orders for Medication holds:    _____________________________________________

## 2023-05-10 NOTE — TELEPHONE ENCOUNTER
She may stop Aspirin for 5 days prior to surgery. Lovenox is not indicated. Semaglutide does not need to be held.

## 2023-05-31 ENCOUNTER — TELEPHONE (OUTPATIENT)
Dept: SURGERY | Age: 63
End: 2023-05-31

## 2023-05-31 NOTE — TELEPHONE ENCOUNTER
Letter created and mailed to patient with results from recent CS at [facility] with Dr. Alexandro Willett on 5/24/2023. Updated history, health maintenance, and recall. Forwarded results to PCP.

## 2023-06-19 ENCOUNTER — OFFICE VISIT (OUTPATIENT)
Dept: DIABETES SERVICES | Age: 63
End: 2023-06-19
Payer: COMMERCIAL

## 2023-06-19 VITALS
WEIGHT: 293 LBS | BODY MASS INDEX: 50.02 KG/M2 | HEIGHT: 64 IN | DIASTOLIC BLOOD PRESSURE: 78 MMHG | RESPIRATION RATE: 16 BRPM | SYSTOLIC BLOOD PRESSURE: 92 MMHG | HEART RATE: 72 BPM

## 2023-06-19 DIAGNOSIS — I10 ESSENTIAL HYPERTENSION: ICD-10-CM

## 2023-06-19 DIAGNOSIS — E78.2 MIXED HYPERLIPIDEMIA: ICD-10-CM

## 2023-06-19 DIAGNOSIS — E11.9 TYPE 2 DIABETES MELLITUS WITHOUT COMPLICATION, WITHOUT LONG-TERM CURRENT USE OF INSULIN (HCC): Primary | ICD-10-CM

## 2023-06-19 DIAGNOSIS — E66.01 CLASS 3 SEVERE OBESITY DUE TO EXCESS CALORIES WITH SERIOUS COMORBIDITY AND BODY MASS INDEX (BMI) OF 50.0 TO 59.9 IN ADULT (HCC): ICD-10-CM

## 2023-06-19 PROCEDURE — 3078F DIAST BP <80 MM HG: CPT | Performed by: NURSE PRACTITIONER

## 2023-06-19 PROCEDURE — 3074F SYST BP LT 130 MM HG: CPT | Performed by: NURSE PRACTITIONER

## 2023-06-19 PROCEDURE — 99214 OFFICE O/P EST MOD 30 MIN: CPT | Performed by: NURSE PRACTITIONER

## 2023-06-19 PROCEDURE — 3044F HG A1C LEVEL LT 7.0%: CPT | Performed by: NURSE PRACTITIONER

## 2023-06-19 RX ORDER — ORAL SEMAGLUTIDE 14 MG/1
TABLET ORAL
Qty: 90 TABLET | Refills: 3 | Status: SHIPPED | OUTPATIENT
Start: 2023-06-19

## 2023-06-19 ASSESSMENT — ENCOUNTER SYMPTOMS
RESPIRATORY NEGATIVE: 1
ABDOMINAL PAIN: 0
SHORTNESS OF BREATH: 0
DIARRHEA: 0

## 2023-06-19 NOTE — PROGRESS NOTES
[address], [address]  [facility] [address] [phone]        HISTORY:    Perez Chavis presents today for evaluation and management of:  Chief Complaint   Patient presents with    Diabetes    6 Month Follow-Up       Patient Care Team:  Jose Hernandez MD as PCP - General (Family Medicine)  [facility]      Interval History:    Past DM Medications   none     Current Diabetic Medications  rybelsus 14 mg     DKA episodes: 0    12/19/22   Perez Chavis is a 63 y.o. female patient who presents to clinic today for her diabetes. she has a history of HTN, hyperlipidemia and obesity which contributes to her diabetes. At previous visit diabetes counseling was provided. she denies any current signs or symptoms of hyper/hypoglycemia. she states they are taking their medications as prescribed without any adverse effects. Diet: [employer]  Exercise: walking  BS testing:  none  Hypoglycemia: No  Hypoglycemia as needed treatment: snack   Issues: denies   Diabetic foot exam up-to-date: Yes  Diabetic retinal exam up-to-date: Yes     Diabetes complications:none    31/44/43   Perez Chavis is a 63 y.o. female patient who presents to clinic today for her diabetes. she has a history of HTN, hyperlipidemia and obesity which contributes to her diabetes. At previous visit diabetes counseling was provided. she denies any current signs or symptoms of hyper/hypoglycemia. she states they are taking their medications as prescribed without any adverse effects. Diet: [employer]  Exercise: walking  BS testing: none  Hypoglycemia: No  Hypoglycemia as needed treatment: snack  Issues:   Diabetic foot exam up-to-date: No  Diabetic retinal exam up-to-date: Yes    Diabetes complications:none      High cholesterol-  Takes zetia and denies any adverse effects with its use. Watches diet and exercise.       Hypertension-  Takes

## 2023-08-23 ENCOUNTER — TELEPHONE (OUTPATIENT)
Dept: FAMILY MEDICINE CLINIC | Age: 63
End: 2023-08-23

## 2023-08-23 DIAGNOSIS — Z12.31 BREAST CANCER SCREENING BY MAMMOGRAM: Primary | ICD-10-CM

## 2023-08-23 NOTE — TELEPHONE ENCOUNTER
Returning patients call. Patient called stating she received a letter regarding schedule mammogram.     Patients last mammogram was 9/12/22- BIRADS:  BIRADS - CATEGORY 1     Negative. Normal interval follow-up is recommended in 12 months.     Patient OK with new mammogram order placed prior to OV on 10/4/23    Order pended

## 2023-08-23 NOTE — TELEPHONE ENCOUNTER
----- Message from [facility] sent at 8/23/2023  2:27 PM EDT -----  Subject: Referral Request    Reason for referral request? is responding to a call about her mammogram,   no orders are listed, please put in orders and let patient know so she can   schedule  Provider patient wants to be referred to(if known):     Provider Phone Number(if known):     Additional Information for Provider?   ---------------------------------------------------------------------------  --------------  [address]    [phone]; OK to leave message on voicemail  ---------------------------------------------------------------------------  --------------

## 2023-09-12 DIAGNOSIS — I10 ESSENTIAL HYPERTENSION: ICD-10-CM

## 2023-09-12 DIAGNOSIS — E78.2 MIXED HYPERLIPIDEMIA: ICD-10-CM

## 2023-09-13 RX ORDER — LISINOPRIL 30 MG/1
TABLET ORAL
Qty: 30 TABLET | Refills: 0 | Status: SHIPPED | OUTPATIENT
Start: 2023-09-13

## 2023-09-13 RX ORDER — EZETIMIBE 10 MG/1
10 TABLET ORAL DAILY
Qty: 30 TABLET | Refills: 0 | Status: SHIPPED | OUTPATIENT
Start: 2023-09-13

## 2023-09-13 NOTE — TELEPHONE ENCOUNTER
Ulices Solorio called requesting a refill of the below medication which has been pended for you:     Requested Prescriptions     Pending Prescriptions Disp Refills    ezetimibe (ZETIA) 10 MG tablet [Pharmacy Med Name: EZETIMIBE 10 MG TABLET] 30 tablet 0     Sig: take 1 tablet by mouth once daily    lisinopril (PRINIVIL;ZESTRIL) 30 MG tablet [Pharmacy Med Name: LISINOPRIL 30 MG TABLET] 30 tablet 0     Sig: take 1 tablet by mouth once daily       Last Appointment Date: 3/21/2023  Next Appointment Date: 10/4/2023    Allergies   Allergen Reactions    Atorvastatin      back pain    Penicillins Hives    Pravastatin Other (See Comments)     Made her back and legs hurt,affected her mood

## 2023-09-29 ENCOUNTER — HOSPITAL ENCOUNTER (OUTPATIENT)
Age: 63
Discharge: HOME | End: 2023-09-29
Payer: COMMERCIAL

## 2023-09-29 DIAGNOSIS — E78.2 MIXED HYPERLIPIDEMIA: ICD-10-CM

## 2023-09-29 DIAGNOSIS — E03.9 ACQUIRED HYPOTHYROIDISM: ICD-10-CM

## 2023-09-29 LAB
ALBUMIN SERPL-MCNC: 4.1 G/DL (ref 3.5–5.2)
ALBUMIN/GLOB SERPL: 1.3 {RATIO} (ref 1–2.5)
ALP SERPL-CCNC: 95 U/L (ref 35–104)
ALT SERPL-CCNC: 16 U/L (ref 5–33)
ANION GAP SERPL CALCULATED.3IONS-SCNC: 9 MMOL/L (ref 9–17)
AST SERPL-CCNC: 16 U/L
BILIRUB SERPL-MCNC: 0.8 MG/DL (ref 0.3–1.2)
BUN SERPL-MCNC: 22 MG/DL (ref 8–23)
BUN/CREAT SERPL: 20 (ref 9–20)
CALCIUM SERPL-MCNC: 9.5 MG/DL (ref 8.6–10.4)
CHLORIDE SERPL-SCNC: 101 MMOL/L (ref 98–107)
CHOLEST SERPL-MCNC: 186 MG/DL
CHOLESTEROL/HDL RATIO: 4
CO2 SERPL-SCNC: 30 MMOL/L (ref 20–31)
CREAT SERPL-MCNC: 1.1 MG/DL (ref 0.5–0.9)
GFR SERPL CREATININE-BSD FRML MDRD: 57 ML/MIN/1.73M2
GLUCOSE SERPL-MCNC: 95 MG/DL (ref 70–99)
HDLC SERPL-MCNC: 47 MG/DL
LDLC SERPL CALC-MCNC: 119 MG/DL (ref 0–130)
POTASSIUM SERPL-SCNC: 4.4 MMOL/L (ref 3.7–5.3)
PROT SERPL-MCNC: 7.2 G/DL (ref 6.4–8.3)
SODIUM SERPL-SCNC: 140 MMOL/L (ref 135–144)
T4 FREE SERPL-MCNC: 1.5 NG/DL (ref 0.9–1.7)
TRIGL SERPL-MCNC: 102 MG/DL
TSH SERPL DL<=0.05 MIU/L-ACNC: 1.74 UIU/ML (ref 0.3–5)

## 2023-09-29 PROCEDURE — 84443 ASSAY THYROID STIM HORMONE: CPT

## 2023-09-29 PROCEDURE — 84439 ASSAY OF FREE THYROXINE: CPT

## 2023-09-29 PROCEDURE — 80053 COMPREHEN METABOLIC PANEL: CPT

## 2023-09-29 PROCEDURE — 36415 COLL VENOUS BLD VENIPUNCTURE: CPT

## 2023-09-29 PROCEDURE — 80061 LIPID PANEL: CPT

## 2023-10-01 DIAGNOSIS — E03.9 ACQUIRED HYPOTHYROIDISM: ICD-10-CM

## 2023-10-04 ENCOUNTER — OFFICE VISIT (OUTPATIENT)
Dept: FAMILY MEDICINE CLINIC | Age: 63
End: 2023-10-04
Payer: COMMERCIAL

## 2023-10-04 VITALS
DIASTOLIC BLOOD PRESSURE: 84 MMHG | OXYGEN SATURATION: 97 % | HEART RATE: 83 BPM | HEIGHT: 63 IN | WEIGHT: 293 LBS | SYSTOLIC BLOOD PRESSURE: 126 MMHG | BODY MASS INDEX: 51.91 KG/M2

## 2023-10-04 DIAGNOSIS — E66.01 SEVERE OBESITY (BMI >= 40) (HCC): ICD-10-CM

## 2023-10-04 DIAGNOSIS — E03.9 ACQUIRED HYPOTHYROIDISM: ICD-10-CM

## 2023-10-04 DIAGNOSIS — E11.9 TYPE 2 DIABETES MELLITUS WITHOUT COMPLICATION, WITHOUT LONG-TERM CURRENT USE OF INSULIN (HCC): ICD-10-CM

## 2023-10-04 DIAGNOSIS — I10 ESSENTIAL HYPERTENSION: Primary | ICD-10-CM

## 2023-10-04 DIAGNOSIS — R60.0 BILATERAL LOWER EXTREMITY EDEMA: ICD-10-CM

## 2023-10-04 DIAGNOSIS — E78.2 MIXED HYPERLIPIDEMIA: ICD-10-CM

## 2023-10-04 PROCEDURE — 3079F DIAST BP 80-89 MM HG: CPT | Performed by: FAMILY MEDICINE

## 2023-10-04 PROCEDURE — 99214 OFFICE O/P EST MOD 30 MIN: CPT | Performed by: FAMILY MEDICINE

## 2023-10-04 PROCEDURE — 3074F SYST BP LT 130 MM HG: CPT | Performed by: FAMILY MEDICINE

## 2023-10-04 RX ORDER — LISINOPRIL 30 MG/1
30 TABLET ORAL DAILY
Qty: 90 TABLET | Refills: 1 | Status: SHIPPED | OUTPATIENT
Start: 2023-10-04

## 2023-10-04 RX ORDER — EZETIMIBE 10 MG/1
10 TABLET ORAL DAILY
Qty: 90 TABLET | Refills: 1 | Status: SHIPPED | OUTPATIENT
Start: 2023-10-04

## 2023-10-04 RX ORDER — LEVOTHYROXINE SODIUM 88 UG/1
88 TABLET ORAL DAILY
Qty: 90 TABLET | Refills: 1 | OUTPATIENT
Start: 2023-10-04

## 2023-10-04 RX ORDER — LEVOTHYROXINE SODIUM 88 UG/1
88 TABLET ORAL DAILY
Qty: 90 TABLET | Refills: 3 | Status: SHIPPED | OUTPATIENT
Start: 2023-10-04 | End: 2024-09-28

## 2023-10-04 RX ORDER — HYDROCHLOROTHIAZIDE 50 MG/1
50 TABLET ORAL EVERY MORNING
Qty: 90 TABLET | Refills: 1 | Status: SHIPPED | OUTPATIENT
Start: 2023-10-04

## 2023-10-04 RX ORDER — ORAL SEMAGLUTIDE 14 MG/1
TABLET ORAL
Qty: 90 TABLET | Refills: 3 | Status: SHIPPED | OUTPATIENT
Start: 2023-10-04

## 2023-10-04 NOTE — PROGRESS NOTES
[facility] (2-)             [address], [address]                        Telephone [phone]             Fax [phone]       Anirudh Montes De Oca  :  1960  Age:  66 y.o. MRN:  [id number]  Date of visit:  10/4/2023       Assessment and Plan:    1. Essential hypertension  2. Bilateral lower extremity edema  Her blood pressure is well-controlled today. (BP: 126/84)   She was advised to continue current medications. Lisinopril and Hydrochlorothiazide were refilled:   - lisinopril (PRINIVIL;ZESTRIL) 30 MG tablet; Take 1 tablet by mouth daily  Dispense: 90 tablet; Refill: 1  - hydroCHLOROthiazide (HYDRODIURIL) 50 MG tablet; Take 1 tablet by mouth every morning  Dispense: 90 tablet; Refill: 1    3. Mixed hyperlipidemia  Her lipid profile was stable on her recent lab work. She has been intolerant of statins. She is tolerating Zetia well; this was refilled:   - ezetimibe (ZETIA) 10 MG tablet; Take 1 tablet by mouth daily  Dispense: 90 tablet; Refill: 1    Labs were ordered to be done prior to her return visit in 6 months:   - Lipid Panel; Future  - Comprehensive Metabolic Panel; Future    4. Acquired hypothyroidism  TSH and free T4 were within normal limits on her recent blood work. She was advised to continue with her current dose of Levothyroxine. Levothyroxine was refilled:   - levothyroxine (EUTHYROX) 88 MCG tablet; Take 1 tablet by mouth Daily  Dispense: 90 tablet; Refill: 3    5. Severe obesity (BMI >= 40) (HCC)  Her 25-hydroxy Vitamin D level was within normal limits (47.0 ng/mL) on 2022. She was advised to continue with her current dose of Vitamin D.        - Vitamin D 25 Hydroxy; Future was ordered to be done prior to her return visit in 6 months. 6.  Routine health maintenance  Health maintenance was reviewed with the patient. She was advised that there is an updated Covid vaccine available this fall.  She was

## 2023-10-04 NOTE — TELEPHONE ENCOUNTER
Kimberly Becton called requesting a refill of the below medication which has been pended for you:     Requested Prescriptions     Pending Prescriptions Disp Refills    Semaglutide (RYBELSUS) 14 MG TABS 90 tablet 3     Sig: take 1 tablet by mouth once daily       Last Appointment Date: 6/19/2023  Next Appointment Date: 12/18/2023    Allergies   Allergen Reactions    Atorvastatin      back pain    Penicillins Hives    Pravastatin Other (See Comments)     Made her back and legs hurt,affected her mood

## 2023-10-10 ENCOUNTER — HOSPITAL ENCOUNTER (OUTPATIENT)
Dept: MAMMOGRAPHY | Age: 63
Discharge: HOME | End: 2023-10-12
Attending: FAMILY MEDICINE
Payer: COMMERCIAL

## 2023-10-10 VITALS — HEIGHT: 63 IN | BODY MASS INDEX: 51.91 KG/M2 | WEIGHT: 293 LBS

## 2023-10-10 DIAGNOSIS — Z12.31 BREAST CANCER SCREENING BY MAMMOGRAM: ICD-10-CM

## 2023-10-10 PROCEDURE — 77063 BREAST TOMOSYNTHESIS BI: CPT

## 2023-12-13 ENCOUNTER — OFFICE VISIT (OUTPATIENT)
Dept: FAMILY MEDICINE CLINIC | Age: 63
End: 2023-12-13
Payer: COMMERCIAL

## 2023-12-13 ENCOUNTER — HOSPITAL ENCOUNTER (OUTPATIENT)
Age: 63
Setting detail: SPECIMEN
Discharge: HOME | End: 2023-12-13
Payer: COMMERCIAL

## 2023-12-13 VITALS
HEIGHT: 63 IN | OXYGEN SATURATION: 98 % | WEIGHT: 246.2 LBS | SYSTOLIC BLOOD PRESSURE: 110 MMHG | DIASTOLIC BLOOD PRESSURE: 76 MMHG | HEART RATE: 74 BPM | BODY MASS INDEX: 43.62 KG/M2

## 2023-12-13 DIAGNOSIS — Z12.4 CERVICAL CANCER SCREENING: ICD-10-CM

## 2023-12-13 DIAGNOSIS — Z01.419 WELL FEMALE EXAM WITH ROUTINE GYNECOLOGICAL EXAM: Primary | ICD-10-CM

## 2023-12-13 PROCEDURE — 3074F SYST BP LT 130 MM HG: CPT | Performed by: FAMILY MEDICINE

## 2023-12-13 PROCEDURE — G0145 SCR C/V CYTO,THINLAYER,RESCR: HCPCS

## 2023-12-13 PROCEDURE — 87624 HPV HI-RISK TYP POOLED RSLT: CPT

## 2023-12-13 PROCEDURE — 3078F DIAST BP <80 MM HG: CPT | Performed by: FAMILY MEDICINE

## 2023-12-13 PROCEDURE — 99396 PREV VISIT EST AGE 40-64: CPT | Performed by: FAMILY MEDICINE

## 2023-12-15 ENCOUNTER — HOSPITAL ENCOUNTER (OUTPATIENT)
Age: 63
Discharge: HOME | End: 2023-12-15
Payer: COMMERCIAL

## 2023-12-15 DIAGNOSIS — E11.9 TYPE 2 DIABETES MELLITUS WITHOUT COMPLICATION, WITHOUT LONG-TERM CURRENT USE OF INSULIN (HCC): ICD-10-CM

## 2023-12-15 LAB
ANION GAP SERPL CALCULATED.3IONS-SCNC: 8 MMOL/L (ref 9–17)
BUN SERPL-MCNC: 22 MG/DL (ref 8–23)
BUN/CREAT SERPL: 22 (ref 9–20)
CALCIUM SERPL-MCNC: 9.5 MG/DL (ref 8.6–10.4)
CHLORIDE SERPL-SCNC: 102 MMOL/L (ref 98–107)
CO2 SERPL-SCNC: 31 MMOL/L (ref 20–31)
CREAT SERPL-MCNC: 1 MG/DL (ref 0.5–0.9)
CREAT UR-MCNC: 167.8 MG/DL (ref 28–217)
CYTOLOGY REPORT: NORMAL
EST. AVERAGE GLUCOSE BLD GHB EST-MCNC: 97 MG/DL
GFR SERPL CREATININE-BSD FRML MDRD: >60 ML/MIN/1.73M2
GLUCOSE SERPL-MCNC: 100 MG/DL (ref 70–99)
HBA1C MFR BLD: 5 % (ref 4–6)
MICROALBUMIN UR-MCNC: 15 MG/L
MICROALBUMIN/CREAT UR-RTO: 9 MCG/MG CREAT
POTASSIUM SERPL-SCNC: 4.3 MMOL/L (ref 3.7–5.3)
SODIUM SERPL-SCNC: 141 MMOL/L (ref 135–144)

## 2023-12-15 PROCEDURE — 82570 ASSAY OF URINE CREATININE: CPT

## 2023-12-15 PROCEDURE — 83036 HEMOGLOBIN GLYCOSYLATED A1C: CPT

## 2023-12-15 PROCEDURE — 80048 BASIC METABOLIC PNL TOTAL CA: CPT

## 2023-12-15 PROCEDURE — 36415 COLL VENOUS BLD VENIPUNCTURE: CPT

## 2023-12-15 PROCEDURE — 82043 UR ALBUMIN QUANTITATIVE: CPT

## 2023-12-17 LAB
HPV I/H RISK 4 DNA CVX QL NAA+PROBE: NOT DETECTED
HPV SAMPLE: NORMAL
HPV, INTERPRETATION: NORMAL
HPV16 DNA CVX QL NAA+PROBE: NOT DETECTED
HPV18 DNA CVX QL NAA+PROBE: NOT DETECTED
SPECIMEN DESCRIPTION: NORMAL

## 2024-02-03 DIAGNOSIS — E78.2 MIXED HYPERLIPIDEMIA: ICD-10-CM

## 2024-02-05 RX ORDER — EZETIMIBE 10 MG/1
10 TABLET ORAL DAILY
Qty: 90 TABLET | Refills: 0 | Status: SHIPPED | OUTPATIENT
Start: 2024-02-05

## 2024-02-05 NOTE — TELEPHONE ENCOUNTER
Shannon called requesting a refill of the below medication which has been pended for you:     Requested Prescriptions     Pending Prescriptions Disp Refills    ezetimibe (ZETIA) 10 MG tablet [Pharmacy Med Name: EZETIMIBE 10 MG TABLET] 90 tablet 1     Sig: take 1 tablet by mouth once daily       Last Appointment Date: 12/13/2023  Next Appointment Date: 4/10/2024    Allergies   Allergen Reactions    Atorvastatin      back pain    Penicillins Hives    Pravastatin Other (See Comments)     Made her back and legs hurt,affected her mood

## 2024-03-13 ENCOUNTER — OFFICE VISIT (OUTPATIENT)
Dept: PRIMARY CARE CLINIC | Age: 64
End: 2024-03-13
Payer: COMMERCIAL

## 2024-03-13 VITALS
BODY MASS INDEX: 51.63 KG/M2 | SYSTOLIC BLOOD PRESSURE: 130 MMHG | DIASTOLIC BLOOD PRESSURE: 64 MMHG | HEART RATE: 105 BPM | TEMPERATURE: 100.7 F | WEIGHT: 291.4 LBS | OXYGEN SATURATION: 92 % | HEIGHT: 63 IN | RESPIRATION RATE: 16 BRPM

## 2024-03-13 DIAGNOSIS — H66.92 LEFT OTITIS MEDIA, UNSPECIFIED OTITIS MEDIA TYPE: Primary | ICD-10-CM

## 2024-03-13 PROCEDURE — 99213 OFFICE O/P EST LOW 20 MIN: CPT | Performed by: FAMILY MEDICINE

## 2024-03-13 PROCEDURE — 3075F SYST BP GE 130 - 139MM HG: CPT | Performed by: FAMILY MEDICINE

## 2024-03-13 PROCEDURE — 3078F DIAST BP <80 MM HG: CPT | Performed by: FAMILY MEDICINE

## 2024-03-13 RX ORDER — AZITHROMYCIN 250 MG/1
TABLET, FILM COATED ORAL
Qty: 6 TABLET | Refills: 0 | Status: SHIPPED | OUTPATIENT
Start: 2024-03-13 | End: 2024-03-23

## 2024-03-13 ASSESSMENT — PATIENT HEALTH QUESTIONNAIRE - PHQ9
2. FEELING DOWN, DEPRESSED OR HOPELESS: 0
1. LITTLE INTEREST OR PLEASURE IN DOING THINGS: 0
SUM OF ALL RESPONSES TO PHQ9 QUESTIONS 1 & 2: 0
SUM OF ALL RESPONSES TO PHQ QUESTIONS 1-9: 0

## 2024-03-13 NOTE — PROGRESS NOTES
[place] [address]                        Telephone [phone]             Fax [phone]       Shannon Lira  :  1960  Age:  63 y.o.   MRN:  [id number]  Date of visit:  3/13/2024       Assessment & Plan:    Left otitis media, unspecified otitis media type  - azithromycin (ZITHROMAX) 250 MG tablet; 500mg on day 1 followed by 250mg on days 2 - 5  Dispense: 6 tablet; Refill: 0    She was advised to follow up if symptoms worsen or do not resolve.         Subjective:    Shannon Lira is a 63 y.o. female who presents to [facility] today (3/13/2024) for evaluation of:  Cough (Cough, sinus drainage,  ear fullness, exhausted. Started 1 week ago.)      She reports cough, sinus drainage, ear fullness, and fatigue for approximately a week.   She has been using over the counter medications without any improvement in her symptoms.  She is not aware of any fever at home.  She is not aware of any ill contacts.         Current medications are:  Current Outpatient Medications   Medication Sig Dispense Refill    ezetimibe (ZETIA) 10 MG tablet take 1 tablet by mouth once daily 90 tablet 0    Semaglutide (RYBELSUS) 14 MG TABS take 1 tablet by mouth once daily 90 tablet 3    lisinopril (PRINIVIL;ZESTRIL) 30 MG tablet Take 1 tablet by mouth daily 90 tablet 1    hydroCHLOROthiazide (HYDRODIURIL) 50 MG tablet Take 1 tablet by mouth every morning 90 tablet 1    levothyroxine (EUTHYROX) 88 MCG tablet Take 1 tablet by mouth Daily 90 tablet 3    aspirin 81 MG chewable tablet Take 1 tablet by mouth daily      vitamin D (CHOLECALCIFEROL) 1000 UNIT TABS tablet Take 1 tablet by mouth daily      Calcium Carbonate-Vitamin D (CALCIUM PLUS VITAMIN D PO) Take  by mouth daily.      Multiple Vitamins-Minerals (MULTIVITAMIN PO) Take  by mouth daily.       No current facility-administered medications for this visit.

## 2024-04-08 ENCOUNTER — HOSPITAL ENCOUNTER (OUTPATIENT)
Age: 64
Discharge: HOME | End: 2024-04-08
Payer: COMMERCIAL

## 2024-04-08 DIAGNOSIS — E66.01 SEVERE OBESITY (BMI >= 40) (HCC): ICD-10-CM

## 2024-04-08 DIAGNOSIS — E78.2 MIXED HYPERLIPIDEMIA: ICD-10-CM

## 2024-04-08 DIAGNOSIS — E11.9 TYPE 2 DIABETES MELLITUS WITHOUT COMPLICATION, WITHOUT LONG-TERM CURRENT USE OF INSULIN (HCC): ICD-10-CM

## 2024-04-08 LAB
25(OH)D3 SERPL-MCNC: 44.7 NG/ML (ref 30–100)
ALBUMIN SERPL-MCNC: 3.7 G/DL (ref 3.5–5.2)
ALBUMIN/GLOB SERPL: 1.2 {RATIO} (ref 1–2.5)
ALP SERPL-CCNC: 81 U/L (ref 35–104)
ALT SERPL-CCNC: 13 U/L (ref 5–33)
ANION GAP SERPL CALCULATED.3IONS-SCNC: 10 MMOL/L (ref 9–17)
ANION GAP SERPL CALCULATED.3IONS-SCNC: 10 MMOL/L (ref 9–17)
AST SERPL-CCNC: 15 U/L
BILIRUB SERPL-MCNC: 0.6 MG/DL (ref 0.3–1.2)
BUN SERPL-MCNC: 19 MG/DL (ref 8–23)
BUN SERPL-MCNC: 19 MG/DL (ref 8–23)
BUN/CREAT SERPL: 19 (ref 9–20)
BUN/CREAT SERPL: 19 (ref 9–20)
CALCIUM SERPL-MCNC: 9.5 MG/DL (ref 8.6–10.4)
CALCIUM SERPL-MCNC: 9.5 MG/DL (ref 8.6–10.4)
CHLORIDE SERPL-SCNC: 103 MMOL/L (ref 98–107)
CHLORIDE SERPL-SCNC: 103 MMOL/L (ref 98–107)
CHOLEST SERPL-MCNC: 177 MG/DL (ref 0–199)
CHOLESTEROL/HDL RATIO: 4
CO2 SERPL-SCNC: 27 MMOL/L (ref 20–31)
CO2 SERPL-SCNC: 27 MMOL/L (ref 20–31)
CREAT SERPL-MCNC: 1 MG/DL (ref 0.5–0.9)
CREAT SERPL-MCNC: 1 MG/DL (ref 0.5–0.9)
EST. AVERAGE GLUCOSE BLD GHB EST-MCNC: 108 MG/DL
GFR SERPL CREATININE-BSD FRML MDRD: 64 ML/MIN/1.73M2
GFR SERPL CREATININE-BSD FRML MDRD: 64 ML/MIN/1.73M2
GLUCOSE SERPL-MCNC: 104 MG/DL (ref 70–99)
GLUCOSE SERPL-MCNC: 104 MG/DL (ref 70–99)
HBA1C MFR BLD: 5.4 % (ref 4–6)
HDLC SERPL-MCNC: 47 MG/DL
LDLC SERPL CALC-MCNC: 107 MG/DL (ref 0–100)
POTASSIUM SERPL-SCNC: 4.1 MMOL/L (ref 3.7–5.3)
POTASSIUM SERPL-SCNC: 4.1 MMOL/L (ref 3.7–5.3)
PROT SERPL-MCNC: 6.8 G/DL (ref 6.4–8.3)
SODIUM SERPL-SCNC: 140 MMOL/L (ref 135–144)
SODIUM SERPL-SCNC: 140 MMOL/L (ref 135–144)
TRIGL SERPL-MCNC: 114 MG/DL
VLDLC SERPL CALC-MCNC: 23 MG/DL

## 2024-04-08 PROCEDURE — 80061 LIPID PANEL: CPT

## 2024-04-08 PROCEDURE — 83036 HEMOGLOBIN GLYCOSYLATED A1C: CPT

## 2024-04-08 PROCEDURE — 80053 COMPREHEN METABOLIC PANEL: CPT

## 2024-04-08 PROCEDURE — 80048 BASIC METABOLIC PNL TOTAL CA: CPT

## 2024-04-08 PROCEDURE — 36415 COLL VENOUS BLD VENIPUNCTURE: CPT

## 2024-04-08 PROCEDURE — 82306 VITAMIN D 25 HYDROXY: CPT

## 2024-04-09 DIAGNOSIS — E11.9 TYPE 2 DIABETES MELLITUS WITHOUT COMPLICATION, WITHOUT LONG-TERM CURRENT USE OF INSULIN (HCC): Primary | ICD-10-CM

## 2024-04-10 ENCOUNTER — OFFICE VISIT (OUTPATIENT)
Dept: FAMILY MEDICINE CLINIC | Age: 64
End: 2024-04-10
Payer: COMMERCIAL

## 2024-04-10 VITALS
OXYGEN SATURATION: 97 % | DIASTOLIC BLOOD PRESSURE: 80 MMHG | SYSTOLIC BLOOD PRESSURE: 118 MMHG | HEIGHT: 63 IN | HEART RATE: 82 BPM | WEIGHT: 287 LBS | BODY MASS INDEX: 50.85 KG/M2

## 2024-04-10 DIAGNOSIS — E78.2 MIXED HYPERLIPIDEMIA: ICD-10-CM

## 2024-04-10 DIAGNOSIS — I10 ESSENTIAL HYPERTENSION: Primary | ICD-10-CM

## 2024-04-10 DIAGNOSIS — N18.31 STAGE 3A CHRONIC KIDNEY DISEASE (HCC): ICD-10-CM

## 2024-04-10 DIAGNOSIS — E03.9 ACQUIRED HYPOTHYROIDISM: ICD-10-CM

## 2024-04-10 DIAGNOSIS — E11.9 TYPE 2 DIABETES MELLITUS WITHOUT COMPLICATION, WITHOUT LONG-TERM CURRENT USE OF INSULIN (HCC): ICD-10-CM

## 2024-04-10 DIAGNOSIS — R60.0 BILATERAL LOWER EXTREMITY EDEMA: ICD-10-CM

## 2024-04-10 PROCEDURE — 3074F SYST BP LT 130 MM HG: CPT | Performed by: FAMILY MEDICINE

## 2024-04-10 PROCEDURE — 3079F DIAST BP 80-89 MM HG: CPT | Performed by: FAMILY MEDICINE

## 2024-04-10 PROCEDURE — 99214 OFFICE O/P EST MOD 30 MIN: CPT | Performed by: FAMILY MEDICINE

## 2024-04-10 PROCEDURE — 3044F HG A1C LEVEL LT 7.0%: CPT | Performed by: FAMILY MEDICINE

## 2024-04-10 RX ORDER — LISINOPRIL 30 MG/1
30 TABLET ORAL DAILY
Qty: 90 TABLET | Refills: 1 | Status: SHIPPED | OUTPATIENT
Start: 2024-04-10

## 2024-04-10 RX ORDER — HYDROCHLOROTHIAZIDE 50 MG/1
50 TABLET ORAL EVERY MORNING
Qty: 90 TABLET | Refills: 1 | Status: SHIPPED | OUTPATIENT
Start: 2024-04-10

## 2024-04-10 RX ORDER — LEVOTHYROXINE SODIUM 88 UG/1
88 TABLET ORAL DAILY
Qty: 90 TABLET | Refills: 3 | Status: SHIPPED | OUTPATIENT
Start: 2024-04-10 | End: 2025-04-05

## 2024-04-10 RX ORDER — EZETIMIBE 10 MG/1
10 TABLET ORAL DAILY
Qty: 90 TABLET | Refills: 0 | Status: SHIPPED | OUTPATIENT
Start: 2024-04-10

## 2024-04-10 SDOH — ECONOMIC STABILITY: FOOD INSECURITY: WITHIN THE PAST 12 MONTHS, THE FOOD YOU BOUGHT JUST DIDN'T LAST AND YOU DIDN'T HAVE MONEY TO GET MORE.: PATIENT DECLINED

## 2024-04-10 SDOH — ECONOMIC STABILITY: FOOD INSECURITY: WITHIN THE PAST 12 MONTHS, YOU WORRIED THAT YOUR FOOD WOULD RUN OUT BEFORE YOU GOT MONEY TO BUY MORE.: PATIENT DECLINED

## 2024-04-10 SDOH — ECONOMIC STABILITY: INCOME INSECURITY: HOW HARD IS IT FOR YOU TO PAY FOR THE VERY BASICS LIKE FOOD, HOUSING, MEDICAL CARE, AND HEATING?: PATIENT DECLINED

## 2024-04-10 SDOH — ECONOMIC STABILITY: HOUSING INSECURITY
IN THE LAST 12 MONTHS, WAS THERE A TIME WHEN YOU DID NOT HAVE A STEADY PLACE TO SLEEP OR SLEPT IN A SHELTER (INCLUDING NOW)?: PATIENT DECLINED

## 2024-04-10 ASSESSMENT — PATIENT HEALTH QUESTIONNAIRE - PHQ9
SUM OF ALL RESPONSES TO PHQ QUESTIONS 1-9: 0
2. FEELING DOWN, DEPRESSED OR HOPELESS: NOT AT ALL
1. LITTLE INTEREST OR PLEASURE IN DOING THINGS: NOT AT ALL
SUM OF ALL RESPONSES TO PHQ9 QUESTIONS 1 & 2: 0
SUM OF ALL RESPONSES TO PHQ QUESTIONS 1-9: 0

## 2024-04-10 NOTE — PROGRESS NOTES
Patient declines all vaccines  
Upper Arm   Position: Sitting   Cuff Size: Large Adult   Pulse: 82   SpO2: 97%   Weight: 130.2 kg (287 lb)   Height: 1.6 m (5' 3\")     Body mass index is 50.84 kg/m².      Well-nourished, well-developed female with severe obesity, healthy-appearing, alert, cooperative, and in no acute distress.  Neck supple. No adenopathy. Thyroid symmetric, normal size.  Chest:  Normal expansion.  Clear to auscultation.  No rales, rhonchi, or wheezing.  Heart sounds are normal.  Regular rate and rhythm without murmur, gallop or rub.  Lower extremities have no edema.    Results of labs done 4/8/2024 were reviewed with the patient:   Hospital Outpatient Visit on 04/08/2024   Component Date Value Ref Range Status    Sodium 04/08/2024 140  135 - 144 mmol/L Final    Potassium 04/08/2024 4.1  3.7 - 5.3 mmol/L Final    Chloride 04/08/2024 103  98 - 107 mmol/L Final    CO2 04/08/2024 27  20 - 31 mmol/L Final    Anion Gap 04/08/2024 10  9 - 17 mmol/L Final    Glucose 04/08/2024 104 (H)  70 - 99 mg/dL Final    BUN 04/08/2024 19  8 - 23 mg/dL Final    Creatinine 04/08/2024 1.0 (H)  0.5 - 0.9 mg/dL Final    Est, Glom Filt Rate 04/08/2024 64  >60 mL/min/1.73m2 Final    Comment:       These results are not intended for use in patients <18 years of age.        eGFR results are calculated without a race factor using the 2021 CKD-EPI equation.  Careful clinical correlation is recommended, particularly when comparing to results   calculated using previous equations.  The CKD-EPI equation is less accurate in patients with extremes of muscle mass, extra-renal   metabolism of creatine, excessive creatine ingestion, or following therapy that affects   renal tubular secretion.      Bun/Cre Ratio 04/08/2024 19  9 - 20 Final    Calcium 04/08/2024 9.5  8.6 - 10.4 mg/dL Final    Hemoglobin A1C 04/08/2024 5.4  4.0 - 6.0 % Final    Estimated Avg Glucose 04/08/2024 108  mg/dL Final    Comment: The ADA and AACC recommend providing the estimated average glucose

## 2024-07-18 DIAGNOSIS — E11.9 TYPE 2 DIABETES MELLITUS WITHOUT COMPLICATION, WITHOUT LONG-TERM CURRENT USE OF INSULIN (HCC): ICD-10-CM

## 2024-07-18 RX ORDER — ORAL SEMAGLUTIDE 14 MG/1
TABLET ORAL
Qty: 90 TABLET | Refills: 3 | Status: SHIPPED | OUTPATIENT
Start: 2024-07-18

## 2024-07-18 NOTE — TELEPHONE ENCOUNTER
Patient called requesting a refill of pended med be sent to [facility] in [place].

## 2024-08-15 DIAGNOSIS — E78.2 MIXED HYPERLIPIDEMIA: ICD-10-CM

## 2024-08-15 RX ORDER — EZETIMIBE 10 MG/1
10 TABLET ORAL DAILY
Qty: 90 TABLET | Refills: 0 | Status: SHIPPED | OUTPATIENT
Start: 2024-08-15

## 2024-08-15 NOTE — TELEPHONE ENCOUNTER
Shannon called requesting a refill of the below medication which has been pended for you:     Requested Prescriptions     Pending Prescriptions Disp Refills    ezetimibe (ZETIA) 10 MG tablet 90 tablet 0     Sig: Take 1 tablet by mouth daily       Last Appointment Date: 4/10/2024  Next Appointment Date: 10/16/2024    Allergies   Allergen Reactions    Atorvastatin      back pain    Penicillins Hives    Pravastatin Other (See Comments)     Made her back and legs hurt,affected her mood

## 2024-10-10 ENCOUNTER — HOSPITAL ENCOUNTER (OUTPATIENT)
Age: 64
Discharge: HOME | End: 2024-10-10
Payer: COMMERCIAL

## 2024-10-10 DIAGNOSIS — E78.2 MIXED HYPERLIPIDEMIA: ICD-10-CM

## 2024-10-10 DIAGNOSIS — E03.9 ACQUIRED HYPOTHYROIDISM: ICD-10-CM

## 2024-10-10 LAB
ALBUMIN SERPL-MCNC: 4 G/DL (ref 3.5–5.2)
ALBUMIN/GLOB SERPL: 1.4 {RATIO} (ref 1–2.5)
ALP SERPL-CCNC: 87 U/L (ref 35–104)
ALT SERPL-CCNC: 16 U/L (ref 5–33)
ANION GAP SERPL CALCULATED.3IONS-SCNC: 9 MMOL/L (ref 9–17)
AST SERPL-CCNC: 17 U/L
BILIRUB SERPL-MCNC: 0.7 MG/DL (ref 0.3–1.2)
BUN SERPL-MCNC: 19 MG/DL (ref 8–23)
BUN/CREAT SERPL: 19 (ref 9–20)
CALCIUM SERPL-MCNC: 9.3 MG/DL (ref 8.6–10.4)
CHLORIDE SERPL-SCNC: 101 MMOL/L (ref 98–107)
CHOLEST SERPL-MCNC: 193 MG/DL (ref 0–199)
CHOLESTEROL/HDL RATIO: 4
CO2 SERPL-SCNC: 29 MMOL/L (ref 20–31)
CREAT SERPL-MCNC: 1 MG/DL (ref 0.5–0.9)
GFR, ESTIMATED: 64 ML/MIN/1.73M2
GLUCOSE SERPL-MCNC: 103 MG/DL (ref 70–99)
HDLC SERPL-MCNC: 46 MG/DL
LDLC SERPL CALC-MCNC: 124 MG/DL (ref 0–100)
POTASSIUM SERPL-SCNC: 4.7 MMOL/L (ref 3.7–5.3)
PROT SERPL-MCNC: 6.9 G/DL (ref 6.4–8.3)
SODIUM SERPL-SCNC: 139 MMOL/L (ref 135–144)
T4 FREE SERPL-MCNC: 1.3 NG/DL (ref 0.92–1.68)
TRIGL SERPL-MCNC: 117 MG/DL
TSH SERPL DL<=0.05 MIU/L-ACNC: 2.71 UIU/ML (ref 0.3–5)
VLDLC SERPL CALC-MCNC: 23 MG/DL

## 2024-10-10 PROCEDURE — 84439 ASSAY OF FREE THYROXINE: CPT

## 2024-10-10 PROCEDURE — 80061 LIPID PANEL: CPT

## 2024-10-10 PROCEDURE — 80053 COMPREHEN METABOLIC PANEL: CPT

## 2024-10-10 PROCEDURE — 36415 COLL VENOUS BLD VENIPUNCTURE: CPT

## 2024-10-10 PROCEDURE — 84443 ASSAY THYROID STIM HORMONE: CPT

## 2024-10-16 ENCOUNTER — OFFICE VISIT (OUTPATIENT)
Dept: FAMILY MEDICINE CLINIC | Age: 64
End: 2024-10-16

## 2024-10-16 VITALS
HEART RATE: 55 BPM | TEMPERATURE: 97.9 F | WEIGHT: 283 LBS | HEIGHT: 64 IN | OXYGEN SATURATION: 99 % | SYSTOLIC BLOOD PRESSURE: 118 MMHG | BODY MASS INDEX: 48.32 KG/M2 | DIASTOLIC BLOOD PRESSURE: 74 MMHG

## 2024-10-16 DIAGNOSIS — R60.0 BILATERAL LOWER EXTREMITY EDEMA: ICD-10-CM

## 2024-10-16 DIAGNOSIS — I10 ESSENTIAL HYPERTENSION: ICD-10-CM

## 2024-10-16 DIAGNOSIS — Z12.31 BREAST CANCER SCREENING BY MAMMOGRAM: ICD-10-CM

## 2024-10-16 DIAGNOSIS — E11.9 TYPE 2 DIABETES MELLITUS WITHOUT COMPLICATION, WITHOUT LONG-TERM CURRENT USE OF INSULIN (HCC): ICD-10-CM

## 2024-10-16 DIAGNOSIS — E03.9 ACQUIRED HYPOTHYROIDISM: ICD-10-CM

## 2024-10-16 DIAGNOSIS — Z00.01 ANNUAL VISIT FOR GENERAL ADULT MEDICAL EXAMINATION WITH ABNORMAL FINDINGS: Primary | ICD-10-CM

## 2024-10-16 DIAGNOSIS — E78.2 MIXED HYPERLIPIDEMIA: ICD-10-CM

## 2024-10-16 RX ORDER — ORAL SEMAGLUTIDE 14 MG/1
TABLET ORAL
Qty: 90 TABLET | Refills: 3 | Status: SHIPPED | OUTPATIENT
Start: 2024-10-16

## 2024-10-16 RX ORDER — LISINOPRIL 30 MG/1
30 TABLET ORAL DAILY
Qty: 90 TABLET | Refills: 1 | Status: SHIPPED | OUTPATIENT
Start: 2024-10-16

## 2024-10-16 RX ORDER — LEVOTHYROXINE SODIUM 88 UG/1
88 TABLET ORAL DAILY
Qty: 90 TABLET | Refills: 3 | Status: SHIPPED | OUTPATIENT
Start: 2024-10-16 | End: 2025-10-11

## 2024-10-16 RX ORDER — EZETIMIBE 10 MG/1
10 TABLET ORAL DAILY
Qty: 90 TABLET | Refills: 1 | Status: SHIPPED | OUTPATIENT
Start: 2024-10-16

## 2024-10-16 RX ORDER — HYDROCHLOROTHIAZIDE 50 MG/1
50 TABLET ORAL EVERY MORNING
Qty: 90 TABLET | Refills: 1 | Status: SHIPPED | OUTPATIENT
Start: 2024-10-16

## 2024-10-16 NOTE — TELEPHONE ENCOUNTER
Shannon called requesting a refill of the below medication which has been pended for you:     Requested Prescriptions     Pending Prescriptions Disp Refills    Semaglutide (RYBELSUS) 14 MG TABS 90 tablet 3     Sig: take 1 tablet by mouth once daily       Last Appointment Date: 12/18/2023  Next Appointment Date: 1/20/2025    Allergies   Allergen Reactions    Atorvastatin      back pain    Penicillins Hives    Pravastatin Other (See Comments)     Made her back and legs hurt,affected her mood

## 2024-10-16 NOTE — PROGRESS NOTES
Flu and covid vaccines given at [facility] on 9/24/24.  Patient declines tdap vaccine. RSV vaccine and pneumonia vaccine at this time  
were advised that Artificial Intelligence will be utilized during this visit to record, process the conversation to generate a clinical note, and support improvement of the AI technology. The patient (or guardian, if applicable) and other individuals in attendance at the appointment consented to the use of AI, including the recording.                   (Please note that portions of this note were completed with a voice-recognition program. Efforts were made to edit the dictation but occasionally words are mis-transcribed.)

## 2024-10-16 NOTE — PATIENT INSTRUCTIONS
Hospital Outpatient Visit on 10/10/2024   Component Date Value Ref Range Status    T4 Free 10/10/2024 1.3  0.92 - 1.68 ng/dL Final    TSH 10/10/2024 2.71  0.30 - 5.00 uIU/mL Final    Cholesterol, Total 10/10/2024 193  0 - 199 mg/dL Final    Comment:    Cholesterol Guidelines:      <200  Desirable   200-240  Borderline      >240  Undesirable         HDL 10/10/2024 46  >40 mg/dL Final    Comment:    HDL Guidelines:    <40     Undesirable   40-59    Borderline    >59     Desirable         LDL Cholesterol 10/10/2024 124 (H)  0 - 100 mg/dL Final    Comment:    LDL Guidelines:     <100    Desirable   100-129   Near to/above Desirable   130-159   Borderline      >159   Undesirable     Direct (measured) LDL and calculated LDL are not interchangeable tests.      Chol/HDL Ratio 10/10/2024 4.0   Final    Triglycerides 10/10/2024 117  <150 mg/dL Final    Comment:    Triglyceride Guidelines:     <150   Desirable   150-199  Borderline   200-499  High     >499   Very high   Based on AHA Guidelines for fasting triglyceride, October 2012.         VLDL 10/10/2024 23  mg/dL Final    Sodium 10/10/2024 139  135 - 144 mmol/L Final    Potassium 10/10/2024 4.7  3.7 - 5.3 mmol/L Final    Chloride 10/10/2024 101  98 - 107 mmol/L Final    CO2 10/10/2024 29  20 - 31 mmol/L Final    Anion Gap 10/10/2024 9  9 - 17 mmol/L Final    Glucose 10/10/2024 103 (H)  70 - 99 mg/dL Final    BUN 10/10/2024 19  8 - 23 mg/dL Final    Creatinine 10/10/2024 1.0 (H)  0.5 - 0.9 mg/dL Final    Est, Glom Filt Rate 10/10/2024 64  >60 mL/min/1.73m2 Final    Comment:       These results are not intended for use in patients <18 years of age.        eGFR results are calculated without a race factor using the 2021 CKD-EPI equation.  Careful clinical correlation is recommended, particularly when comparing to results   calculated using previous equations.  The CKD-EPI equation is less accurate in patients with extremes of muscle mass, extra-renal   metabolism of creatine,

## 2024-11-14 ENCOUNTER — TELEPHONE (OUTPATIENT)
Dept: FAMILY MEDICINE CLINIC | Age: 64
End: 2024-11-14

## 2024-11-14 ENCOUNTER — HOSPITAL ENCOUNTER (OUTPATIENT)
Dept: MAMMOGRAPHY | Age: 64
Discharge: HOME | End: 2024-11-16
Attending: FAMILY MEDICINE
Payer: COMMERCIAL

## 2024-11-14 VITALS — HEIGHT: 64 IN | BODY MASS INDEX: 47.8 KG/M2 | WEIGHT: 280 LBS

## 2024-11-14 DIAGNOSIS — Z12.31 BREAST CANCER SCREENING BY MAMMOGRAM: ICD-10-CM

## 2024-11-14 DIAGNOSIS — E78.2 MIXED HYPERLIPIDEMIA: ICD-10-CM

## 2024-11-14 PROCEDURE — 77063 BREAST TOMOSYNTHESIS BI: CPT

## 2024-11-14 RX ORDER — EZETIMIBE 10 MG/1
10 TABLET ORAL DAILY
Qty: 90 TABLET | Refills: 1 | OUTPATIENT
Start: 2024-11-14

## 2025-01-13 ENCOUNTER — HOSPITAL ENCOUNTER (OUTPATIENT)
Age: 65
Discharge: HOME | End: 2025-01-13
Payer: COMMERCIAL

## 2025-01-13 DIAGNOSIS — E11.9 TYPE 2 DIABETES MELLITUS WITHOUT COMPLICATION, WITHOUT LONG-TERM CURRENT USE OF INSULIN (HCC): ICD-10-CM

## 2025-01-13 LAB
EST. AVERAGE GLUCOSE BLD GHB EST-MCNC: 97 MG/DL
HBA1C MFR BLD: 5 % (ref 4–6)

## 2025-01-13 PROCEDURE — 83036 HEMOGLOBIN GLYCOSYLATED A1C: CPT

## 2025-01-13 PROCEDURE — 36415 COLL VENOUS BLD VENIPUNCTURE: CPT

## 2025-01-20 ENCOUNTER — OFFICE VISIT (OUTPATIENT)
Dept: DIABETES SERVICES | Age: 65
End: 2025-01-20
Payer: COMMERCIAL

## 2025-01-20 VITALS
BODY MASS INDEX: 50.14 KG/M2 | DIASTOLIC BLOOD PRESSURE: 78 MMHG | OXYGEN SATURATION: 96 % | HEART RATE: 70 BPM | HEIGHT: 63 IN | WEIGHT: 283 LBS | SYSTOLIC BLOOD PRESSURE: 110 MMHG

## 2025-01-20 DIAGNOSIS — E11.9 TYPE 2 DIABETES MELLITUS WITHOUT COMPLICATION, WITHOUT LONG-TERM CURRENT USE OF INSULIN (HCC): ICD-10-CM

## 2025-01-20 DIAGNOSIS — I10 ESSENTIAL HYPERTENSION: ICD-10-CM

## 2025-01-20 DIAGNOSIS — E66.813 CLASS 3 SEVERE OBESITY DUE TO EXCESS CALORIES WITH SERIOUS COMORBIDITY AND BODY MASS INDEX (BMI) OF 50.0 TO 59.9 IN ADULT: ICD-10-CM

## 2025-01-20 DIAGNOSIS — E66.01 CLASS 3 SEVERE OBESITY DUE TO EXCESS CALORIES WITH SERIOUS COMORBIDITY AND BODY MASS INDEX (BMI) OF 50.0 TO 59.9 IN ADULT: ICD-10-CM

## 2025-01-20 DIAGNOSIS — E78.2 MIXED HYPERLIPIDEMIA: ICD-10-CM

## 2025-01-20 DIAGNOSIS — E11.9 TYPE 2 DIABETES MELLITUS WITHOUT COMPLICATION, WITHOUT LONG-TERM CURRENT USE OF INSULIN (HCC): Primary | ICD-10-CM

## 2025-01-20 PROCEDURE — 3074F SYST BP LT 130 MM HG: CPT | Performed by: NURSE PRACTITIONER

## 2025-01-20 PROCEDURE — G2211 COMPLEX E/M VISIT ADD ON: HCPCS | Performed by: NURSE PRACTITIONER

## 2025-01-20 PROCEDURE — 99214 OFFICE O/P EST MOD 30 MIN: CPT | Performed by: NURSE PRACTITIONER

## 2025-01-20 PROCEDURE — 3044F HG A1C LEVEL LT 7.0%: CPT | Performed by: NURSE PRACTITIONER

## 2025-01-20 PROCEDURE — 3078F DIAST BP <80 MM HG: CPT | Performed by: NURSE PRACTITIONER

## 2025-01-20 ASSESSMENT — ENCOUNTER SYMPTOMS
RESPIRATORY NEGATIVE: 1
SHORTNESS OF BREATH: 0

## 2025-01-20 NOTE — PROGRESS NOTES
(or guardian, if applicable) and other individuals in attendance at the appointment consented to the use of AI, including the recording.            Electronically signed by ARISTEO Han CNP on 1/20/2025 at 11:13 AM      (Please note that portions of this note were completed with a voice-recognition program. Efforts were made to edit the dictation but occasionally words are mis-transcribed.)

## 2025-01-21 RX ORDER — ORAL SEMAGLUTIDE 14 MG/1
TABLET ORAL
Qty: 90 TABLET | Refills: 3 | Status: SHIPPED | OUTPATIENT
Start: 2025-01-21

## 2025-02-17 ENCOUNTER — PATIENT MESSAGE (OUTPATIENT)
Dept: DIABETES SERVICES | Age: 65
End: 2025-02-17

## 2025-02-17 DIAGNOSIS — E11.9 TYPE 2 DIABETES MELLITUS WITHOUT COMPLICATION, WITHOUT LONG-TERM CURRENT USE OF INSULIN (HCC): ICD-10-CM

## 2025-02-17 DIAGNOSIS — E78.2 MIXED HYPERLIPIDEMIA: ICD-10-CM

## 2025-02-17 RX ORDER — EZETIMIBE 10 MG/1
10 TABLET ORAL DAILY
Qty: 90 TABLET | Refills: 0 | Status: SHIPPED | OUTPATIENT
Start: 2025-02-17

## 2025-02-17 RX ORDER — ORAL SEMAGLUTIDE 14 MG/1
TABLET ORAL
Qty: 90 TABLET | Refills: 3 | OUTPATIENT
Start: 2025-02-17

## 2025-02-17 NOTE — TELEPHONE ENCOUNTER
Rybelsus 14 mg was approved on 1/21/25 (#90, 3 refills) to [facility] [place].  Spoke with pharm- they do have the script available. He will process the request. Medication will be ready Wednesday after 2:00 pm.

## 2025-03-11 ENCOUNTER — TELEPHONE (OUTPATIENT)
Dept: SURGERY | Age: 65
End: 2025-03-11

## 2025-04-17 DIAGNOSIS — R60.0 BILATERAL LOWER EXTREMITY EDEMA: ICD-10-CM

## 2025-04-17 DIAGNOSIS — I10 ESSENTIAL HYPERTENSION: ICD-10-CM

## 2025-04-17 RX ORDER — LISINOPRIL 30 MG/1
30 TABLET ORAL DAILY
Qty: 90 TABLET | Refills: 0 | Status: SHIPPED | OUTPATIENT
Start: 2025-04-17

## 2025-04-17 RX ORDER — HYDROCHLOROTHIAZIDE 50 MG/1
50 TABLET ORAL EVERY MORNING
Qty: 90 TABLET | Refills: 0 | Status: SHIPPED | OUTPATIENT
Start: 2025-04-17

## 2025-04-17 NOTE — TELEPHONE ENCOUNTER
Shannon called requesting a refill of the below medication which has been pended for you:     Requested Prescriptions     Pending Prescriptions Disp Refills    hydroCHLOROthiazide (HYDRODIURIL) 50 MG tablet [Pharmacy Med Name: HYDROCHLOROTHIAZIDE 50MG TABLETS] 90 tablet 0     Sig: TAKE 1 TABLET BY MOUTH EVERY MORNING    lisinopril (PRINIVIL;ZESTRIL) 30 MG tablet [Pharmacy Med Name: LISINOPRIL 30MG TABLETS] 90 tablet 0     Sig: TAKE 1 TABLET BY MOUTH DAILY       Last Appointment Date: 10/16/2024  Next Appointment Date: 4/30/2025    Allergies   Allergen Reactions    Atorvastatin      back pain    Penicillins Hives    Pravastatin Other (See Comments)     Made her back and legs hurt,affected her mood

## 2025-04-26 ENCOUNTER — HOSPITAL ENCOUNTER (OUTPATIENT)
Age: 65
Discharge: HOME | End: 2025-04-26
Payer: COMMERCIAL

## 2025-04-26 DIAGNOSIS — E78.2 MIXED HYPERLIPIDEMIA: ICD-10-CM

## 2025-04-26 LAB
ALBUMIN SERPL-MCNC: 4 G/DL (ref 3.5–5.2)
ALBUMIN/GLOB SERPL: 1.4 {RATIO} (ref 1–2.5)
ALP SERPL-CCNC: 96 U/L (ref 35–104)
ALT SERPL-CCNC: 15 U/L (ref 10–35)
ANION GAP SERPL CALCULATED.3IONS-SCNC: 12 MMOL/L (ref 9–16)
AST SERPL-CCNC: 18 U/L (ref 10–35)
BILIRUB SERPL-MCNC: 0.6 MG/DL (ref 0–1.2)
BUN SERPL-MCNC: 20 MG/DL (ref 8–23)
BUN/CREAT SERPL: 20 (ref 9–20)
CALCIUM SERPL-MCNC: 9.3 MG/DL (ref 8.6–10.4)
CHLORIDE SERPL-SCNC: 102 MMOL/L (ref 98–107)
CHOLEST SERPL-MCNC: 169 MG/DL (ref 0–199)
CHOLESTEROL/HDL RATIO: 3.4
CO2 SERPL-SCNC: 26 MMOL/L (ref 20–31)
CREAT SERPL-MCNC: 1 MG/DL (ref 0.6–0.9)
GFR, ESTIMATED: 64 ML/MIN/1.73M2
GLUCOSE SERPL-MCNC: 87 MG/DL (ref 74–99)
HDLC SERPL-MCNC: 49 MG/DL
LDLC SERPL CALC-MCNC: 96 MG/DL (ref 0–100)
POTASSIUM SERPL-SCNC: 4.3 MMOL/L (ref 3.7–5.3)
PROT SERPL-MCNC: 6.8 G/DL (ref 6.6–8.7)
SODIUM SERPL-SCNC: 140 MMOL/L (ref 136–145)
TRIGL SERPL-MCNC: 121 MG/DL
VLDLC SERPL CALC-MCNC: 24 MG/DL (ref 1–30)

## 2025-04-26 PROCEDURE — 36415 COLL VENOUS BLD VENIPUNCTURE: CPT

## 2025-04-26 PROCEDURE — 80053 COMPREHEN METABOLIC PANEL: CPT

## 2025-04-26 PROCEDURE — 80061 LIPID PANEL: CPT

## 2025-04-30 ENCOUNTER — RESULTS FOLLOW-UP (OUTPATIENT)
Dept: FAMILY MEDICINE CLINIC | Age: 65
End: 2025-04-30

## 2025-04-30 ENCOUNTER — OFFICE VISIT (OUTPATIENT)
Dept: FAMILY MEDICINE CLINIC | Age: 65
End: 2025-04-30
Payer: COMMERCIAL

## 2025-04-30 VITALS
TEMPERATURE: 97.2 F | SYSTOLIC BLOOD PRESSURE: 124 MMHG | HEART RATE: 70 BPM | HEIGHT: 64 IN | WEIGHT: 288 LBS | RESPIRATION RATE: 16 BRPM | BODY MASS INDEX: 49.17 KG/M2 | OXYGEN SATURATION: 100 % | DIASTOLIC BLOOD PRESSURE: 82 MMHG

## 2025-04-30 DIAGNOSIS — E78.2 MIXED HYPERLIPIDEMIA: ICD-10-CM

## 2025-04-30 DIAGNOSIS — Z12.11 COLON CANCER SCREENING: ICD-10-CM

## 2025-04-30 DIAGNOSIS — R60.0 BILATERAL LOWER EXTREMITY EDEMA: ICD-10-CM

## 2025-04-30 DIAGNOSIS — I10 ESSENTIAL HYPERTENSION: Primary | ICD-10-CM

## 2025-04-30 DIAGNOSIS — E03.9 ACQUIRED HYPOTHYROIDISM: ICD-10-CM

## 2025-04-30 PROCEDURE — 3074F SYST BP LT 130 MM HG: CPT | Performed by: FAMILY MEDICINE

## 2025-04-30 PROCEDURE — 3079F DIAST BP 80-89 MM HG: CPT | Performed by: FAMILY MEDICINE

## 2025-04-30 PROCEDURE — 99214 OFFICE O/P EST MOD 30 MIN: CPT | Performed by: FAMILY MEDICINE

## 2025-04-30 RX ORDER — HYDROCHLOROTHIAZIDE 50 MG/1
50 TABLET ORAL EVERY MORNING
Qty: 90 TABLET | Refills: 1 | Status: CANCELLED | OUTPATIENT
Start: 2025-04-30

## 2025-04-30 RX ORDER — EZETIMIBE 10 MG/1
10 TABLET ORAL DAILY
Qty: 90 TABLET | Refills: 1 | Status: SHIPPED | OUTPATIENT
Start: 2025-04-30

## 2025-04-30 RX ORDER — LISINOPRIL 30 MG/1
30 TABLET ORAL DAILY
Qty: 90 TABLET | Refills: 1 | Status: CANCELLED | OUTPATIENT
Start: 2025-04-30

## 2025-04-30 SDOH — ECONOMIC STABILITY: FOOD INSECURITY: WITHIN THE PAST 12 MONTHS, YOU WORRIED THAT YOUR FOOD WOULD RUN OUT BEFORE YOU GOT MONEY TO BUY MORE.: PATIENT DECLINED

## 2025-04-30 SDOH — ECONOMIC STABILITY: FOOD INSECURITY: WITHIN THE PAST 12 MONTHS, THE FOOD YOU BOUGHT JUST DIDN'T LAST AND YOU DIDN'T HAVE MONEY TO GET MORE.: PATIENT DECLINED

## 2025-04-30 ASSESSMENT — PATIENT HEALTH QUESTIONNAIRE - PHQ9
SUM OF ALL RESPONSES TO PHQ QUESTIONS 1-9: 0
1. LITTLE INTEREST OR PLEASURE IN DOING THINGS: NOT AT ALL
SUM OF ALL RESPONSES TO PHQ QUESTIONS 1-9: 0
2. FEELING DOWN, DEPRESSED OR HOPELESS: NOT AT ALL
SUM OF ALL RESPONSES TO PHQ QUESTIONS 1-9: 0
SUM OF ALL RESPONSES TO PHQ QUESTIONS 1-9: 0

## 2025-04-30 NOTE — PROGRESS NOTES
Patient declines all vaccines today. She states she is scheduled for an eye exam in June.   She also states she has the paperwork to schedule colonoscopy

## 2025-04-30 NOTE — PROGRESS NOTES
[place] [address]                        Telephone [phone]             Fax [phone]       Shannon Lira  :  1960  Age:  64 y.o.   MRN:  [id number]  Date of visit:  2025       Assessment and Plan:     1. Essential hypertension  Her blood pressure is well-controlled today.  (BP: 124/82)   She was advised to continue current medications (Lisinopril and Hydrochlorothiazide).  She states that she does not need refills today.     2. Mixed hyperlipidemia  Her lipid profile was improved on her recent lab work.     I reviewed the ASCVD risk score:    The 10-year ASCVD risk score (Ludy RICHARDSON, et al., 2019) is: 9.2%    Values used to calculate the score:      Age: 64 years      Sex: Female      Is Non- : No      Diabetic: Yes      Tobacco smoker: No      Systolic Blood Pressure: 124 mmHg      Is BP treated: Yes      HDL Cholesterol: 49 mg/dL      Total Cholesterol: 169 mg/dL      She is tolerating Zetia well; this was refilled:   - ezetimibe (ZETIA) 10 MG tablet; Take 1 tablet by mouth daily  Dispense: 90 tablet; Refill: 1    Labs were ordered to be done prior to her return visit in 6 months:   - Comprehensive Metabolic Panel; Future  - Lipid Panel; Future    3. Acquired hypothyroidism  TSH and free T4 were in the normal range on 10/10/2024.  Lab Results   Component Value Date    TSH 2.71 10/10/2024    T4FREE 1.3 10/10/2024      She was advised to continue with her current dose of Levothyroxine.  She states that she does not need a refill today.      Labs were ordered to be done prior to her return visit in 6 months:   - TSH; Future  - T4, Free; Future    4. Bilateral lower extremity edema  She is doing well with her current dose of Hydrochlorothiazide.  She states that she does not need a refill today.     5. Colon cancer screening  She was reminded that she is due for colon cancer

## 2025-07-16 ENCOUNTER — HOSPITAL ENCOUNTER (OUTPATIENT)
Age: 65
Discharge: HOME | End: 2025-07-16
Payer: COMMERCIAL

## 2025-07-16 DIAGNOSIS — E11.9 TYPE 2 DIABETES MELLITUS WITHOUT COMPLICATION, WITHOUT LONG-TERM CURRENT USE OF INSULIN (HCC): ICD-10-CM

## 2025-07-16 LAB
CREAT UR-MCNC: 160 MG/DL (ref 28–217)
EST. AVERAGE GLUCOSE BLD GHB EST-MCNC: 97 MG/DL
HBA1C MFR BLD: 5 % (ref 4–6)
MICROALBUMIN UR-MCNC: <12 MG/L (ref 0–20)
MICROALBUMIN/CREAT UR-RTO: NORMAL MCG/MG CREAT (ref 0–25)

## 2025-07-16 PROCEDURE — 36415 COLL VENOUS BLD VENIPUNCTURE: CPT

## 2025-07-16 PROCEDURE — 82570 ASSAY OF URINE CREATININE: CPT

## 2025-07-16 PROCEDURE — 83036 HEMOGLOBIN GLYCOSYLATED A1C: CPT

## 2025-07-16 PROCEDURE — 82043 UR ALBUMIN QUANTITATIVE: CPT

## 2025-07-21 ENCOUNTER — OFFICE VISIT (OUTPATIENT)
Dept: DIABETES SERVICES | Age: 65
End: 2025-07-21
Payer: COMMERCIAL

## 2025-07-21 VITALS
SYSTOLIC BLOOD PRESSURE: 110 MMHG | HEART RATE: 60 BPM | BODY MASS INDEX: 48.83 KG/M2 | HEIGHT: 64 IN | DIASTOLIC BLOOD PRESSURE: 74 MMHG | WEIGHT: 286 LBS

## 2025-07-21 DIAGNOSIS — E78.2 MIXED HYPERLIPIDEMIA: ICD-10-CM

## 2025-07-21 DIAGNOSIS — I10 ESSENTIAL HYPERTENSION: ICD-10-CM

## 2025-07-21 DIAGNOSIS — E11.9 TYPE 2 DIABETES MELLITUS WITHOUT COMPLICATION, WITHOUT LONG-TERM CURRENT USE OF INSULIN (HCC): Primary | ICD-10-CM

## 2025-07-21 PROCEDURE — 3078F DIAST BP <80 MM HG: CPT | Performed by: NURSE PRACTITIONER

## 2025-07-21 PROCEDURE — 3074F SYST BP LT 130 MM HG: CPT | Performed by: NURSE PRACTITIONER

## 2025-07-21 PROCEDURE — 99214 OFFICE O/P EST MOD 30 MIN: CPT | Performed by: NURSE PRACTITIONER

## 2025-07-21 PROCEDURE — 3044F HG A1C LEVEL LT 7.0%: CPT | Performed by: NURSE PRACTITIONER

## 2025-07-21 PROCEDURE — G2211 COMPLEX E/M VISIT ADD ON: HCPCS | Performed by: NURSE PRACTITIONER

## 2025-07-21 RX ORDER — ORAL SEMAGLUTIDE 14 MG/1
TABLET ORAL
Qty: 90 TABLET | Refills: 3 | Status: SHIPPED | OUTPATIENT
Start: 2025-07-21

## 2025-07-21 ASSESSMENT — ENCOUNTER SYMPTOMS
RESPIRATORY NEGATIVE: 1
SHORTNESS OF BREATH: 0

## 2025-07-21 NOTE — PROGRESS NOTES
Patient Care Team:  Luanne Colon MD as PCP - General (Family Medicine)  Luanne Colon MD as PCP - Empaneled Provider    Past DM Medications   none     Current Diabetic Medications  rybelsus 14 mg     DKA episodes: 0    History of Present Illness  The patient presents for evaluation and management of diabetes mellitus, hyperlipidemia, and hypertension.    The patient is currently on a daily regimen of Rybelsus 14 mg for diabetes mellitus. They do not perform self-monitoring of blood glucose levels at home. The patient reports occasional paresthesia in the pads of their feet but denies other symptoms such as blurred vision, polydipsia, polyuria, or delayed wound healing. No hypoglycemic symptoms such as tremors, diaphoresis, dizziness, or weakness are reported. The patient has not experienced any adverse effects from Rybelsus, including nausea or vomiting. The medication is administered first thing in the morning on an empty stomach, followed by a 30-minute interval before consuming any other substances. A weight reduction of 20 to 30 pounds since initiating Rybelsus therapy is noted, although this weight loss has now stabilized. The patient is due for a refill of Rybelsus. Dietary modifications are being implemented, emphasizing portion control and low carbohydrate intake, with efforts to eliminate bread from their diet.    For hyperlipidemia management, the patient is prescribed Zetia.    For hypertension management, the patient is on lisinopril and hydrochlorothiazide.    Social History:  Diet: The patient is making efforts to control their diet, focusing on portion control and low carbohydrate intake, and is trying to eliminate bread from their diet.      ICD-10-CM    1. Mixed hyperlipidemia  E78.2       2. Type 2 diabetes mellitus without complication, without long-term current use of insulin (HCC)  E11.9       3. Essential hypertension  I10           Assessment & Plan  1. Diabetes: Stable. A1c level

## 2025-07-23 DIAGNOSIS — I10 ESSENTIAL HYPERTENSION: ICD-10-CM

## 2025-07-23 DIAGNOSIS — R60.0 BILATERAL LOWER EXTREMITY EDEMA: ICD-10-CM

## 2025-07-23 RX ORDER — HYDROCHLOROTHIAZIDE 50 MG/1
50 TABLET ORAL EVERY MORNING
Qty: 90 TABLET | Refills: 1 | Status: SHIPPED | OUTPATIENT
Start: 2025-07-23

## 2025-07-23 RX ORDER — LISINOPRIL 30 MG/1
30 TABLET ORAL DAILY
Qty: 90 TABLET | Refills: 1 | Status: SHIPPED | OUTPATIENT
Start: 2025-07-23

## 2025-07-23 NOTE — TELEPHONE ENCOUNTER
Shannon called requesting a refill of the below medication which has been pended for you:     Requested Prescriptions     Pending Prescriptions Disp Refills    hydroCHLOROthiazide (HYDRODIURIL) 50 MG tablet 90 tablet 1     Sig: Take 1 tablet by mouth every morning    lisinopril (PRINIVIL;ZESTRIL) 30 MG tablet 90 tablet 1     Sig: Take 1 tablet by mouth daily       Last Appointment Date: 4/30/2025  Next Appointment Date: 11/4/2025    Allergies   Allergen Reactions    Atorvastatin      back pain    Penicillins Hives    Pravastatin Other (See Comments)     Made her back and legs hurt,affected her mood

## 2025-09-02 DIAGNOSIS — E78.2 MIXED HYPERLIPIDEMIA: ICD-10-CM

## 2025-09-02 RX ORDER — EZETIMIBE 10 MG/1
10 TABLET ORAL DAILY
Qty: 90 TABLET | Refills: 0 | Status: SHIPPED | OUTPATIENT
Start: 2025-09-02